# Patient Record
Sex: FEMALE | Race: WHITE | NOT HISPANIC OR LATINO
[De-identification: names, ages, dates, MRNs, and addresses within clinical notes are randomized per-mention and may not be internally consistent; named-entity substitution may affect disease eponyms.]

---

## 2017-01-18 ENCOUNTER — APPOINTMENT (OUTPATIENT)
Dept: INTERNAL MEDICINE | Facility: CLINIC | Age: 77
End: 2017-01-18

## 2017-01-18 VITALS
BODY MASS INDEX: 26.87 KG/M2 | HEIGHT: 58 IN | RESPIRATION RATE: 14 BRPM | OXYGEN SATURATION: 98 % | TEMPERATURE: 97.9 F | DIASTOLIC BLOOD PRESSURE: 80 MMHG | WEIGHT: 128 LBS | SYSTOLIC BLOOD PRESSURE: 130 MMHG | HEART RATE: 103 BPM

## 2017-01-18 DIAGNOSIS — H00.019 HORDEOLUM EXTERNUM UNSPECIFIED EYE, UNSPECIFIED EYELID: ICD-10-CM

## 2017-01-18 DIAGNOSIS — Z78.9 OTHER SPECIFIED HEALTH STATUS: ICD-10-CM

## 2017-01-19 ENCOUNTER — OTHER (OUTPATIENT)
Age: 77
End: 2017-01-19

## 2017-01-19 LAB — HEMOCCULT STL QL IA: NEGATIVE

## 2017-03-31 ENCOUNTER — APPOINTMENT (OUTPATIENT)
Dept: UROGYNECOLOGY | Facility: CLINIC | Age: 77
End: 2017-03-31

## 2017-03-31 VITALS
HEIGHT: 58 IN | WEIGHT: 129 LBS | BODY MASS INDEX: 27.08 KG/M2 | SYSTOLIC BLOOD PRESSURE: 140 MMHG | DIASTOLIC BLOOD PRESSURE: 78 MMHG

## 2017-03-31 DIAGNOSIS — Z80.3 FAMILY HISTORY OF MALIGNANT NEOPLASM OF BREAST: ICD-10-CM

## 2017-03-31 DIAGNOSIS — Z37.9 ENCOUNTER FOR FULL-TERM UNCOMPLICATED DELIVERY: ICD-10-CM

## 2017-04-03 ENCOUNTER — RESULT REVIEW (OUTPATIENT)
Age: 77
End: 2017-04-03

## 2017-04-03 LAB
APPEARANCE: CLEAR
BACTERIA UR CULT: NORMAL
BACTERIA: NEGATIVE
BILIRUB UR QL STRIP: NORMAL
BILIRUBIN URINE: NEGATIVE
BLOOD URINE: NEGATIVE
CLARITY UR: CLEAR
COLLECTION METHOD: NORMAL
COLOR: YELLOW
GLUCOSE QUALITATIVE U: NORMAL MG/DL
GLUCOSE UR-MCNC: NORMAL
HCG UR QL: 0.2 EU/DL
HGB UR QL STRIP.AUTO: NORMAL
HYALINE CASTS: 3 /LPF
KETONES UR-MCNC: NORMAL
KETONES URINE: NEGATIVE
LEUKOCYTE ESTERASE UR QL STRIP: NORMAL
LEUKOCYTE ESTERASE URINE: NEGATIVE
MICROSCOPIC-UA: NORMAL
NITRITE UR QL STRIP: NORMAL
NITRITE URINE: NEGATIVE
PH UR STRIP: 5.5
PH URINE: 6
PROT UR STRIP-MCNC: NORMAL
PROTEIN URINE: NEGATIVE MG/DL
RED BLOOD CELLS URINE: 1 /HPF
SP GR UR STRIP: 1.02
SPECIFIC GRAVITY URINE: 1.02
SQUAMOUS EPITHELIAL CELLS: 1 /HPF
UROBILINOGEN URINE: NORMAL MG/DL
WHITE BLOOD CELLS URINE: 0 /HPF

## 2017-06-05 ENCOUNTER — APPOINTMENT (OUTPATIENT)
Dept: INTERNAL MEDICINE | Facility: CLINIC | Age: 77
End: 2017-06-05

## 2017-06-05 VITALS
BODY MASS INDEX: 25.61 KG/M2 | OXYGEN SATURATION: 97 % | SYSTOLIC BLOOD PRESSURE: 120 MMHG | HEART RATE: 70 BPM | RESPIRATION RATE: 14 BRPM | DIASTOLIC BLOOD PRESSURE: 80 MMHG | WEIGHT: 122 LBS | TEMPERATURE: 98 F | HEIGHT: 58 IN

## 2017-06-05 LAB — S PYO AG SPEC QL IA: NORMAL

## 2017-06-06 LAB
T3FREE SERPL-MCNC: 2.56 PG/ML
T4 FREE SERPL-MCNC: 1.1 NG/DL
TSH SERPL-ACNC: 0.76 UIU/ML

## 2017-06-22 ENCOUNTER — APPOINTMENT (OUTPATIENT)
Dept: UROGYNECOLOGY | Facility: CLINIC | Age: 77
End: 2017-06-22

## 2017-11-02 ENCOUNTER — APPOINTMENT (OUTPATIENT)
Dept: INTERNAL MEDICINE | Facility: CLINIC | Age: 77
End: 2017-11-02
Payer: MEDICARE

## 2017-11-02 DIAGNOSIS — Z23 ENCOUNTER FOR IMMUNIZATION: ICD-10-CM

## 2017-11-02 PROCEDURE — G0008: CPT

## 2017-11-02 PROCEDURE — 90472 IMMUNIZATION ADMIN EACH ADD: CPT

## 2017-11-02 PROCEDURE — 90670 PCV13 VACCINE IM: CPT

## 2017-11-02 PROCEDURE — G0009: CPT

## 2017-11-02 PROCEDURE — 90686 IIV4 VACC NO PRSV 0.5 ML IM: CPT

## 2018-01-19 ENCOUNTER — APPOINTMENT (OUTPATIENT)
Dept: INTERNAL MEDICINE | Facility: CLINIC | Age: 78
End: 2018-01-19
Payer: MEDICARE

## 2018-01-19 VITALS
BODY MASS INDEX: 25.82 KG/M2 | OXYGEN SATURATION: 98 % | RESPIRATION RATE: 14 BRPM | HEIGHT: 58 IN | TEMPERATURE: 98.1 F | HEART RATE: 71 BPM | WEIGHT: 123 LBS | DIASTOLIC BLOOD PRESSURE: 76 MMHG | SYSTOLIC BLOOD PRESSURE: 138 MMHG

## 2018-01-19 LAB
APPEARANCE: CLEAR
BILIRUBIN URINE: NEGATIVE
BLOOD URINE: NEGATIVE
COLOR: YELLOW
GLUCOSE QUALITATIVE U: NEGATIVE MG/DL
KETONES URINE: NEGATIVE
LEUKOCYTE ESTERASE URINE: NEGATIVE
NITRITE URINE: NEGATIVE
PH URINE: 6.5
PROTEIN URINE: NEGATIVE MG/DL
SPECIFIC GRAVITY URINE: 1.01
UROBILINOGEN URINE: NEGATIVE MG/DL

## 2018-01-19 PROCEDURE — G0439: CPT

## 2018-06-01 ENCOUNTER — APPOINTMENT (OUTPATIENT)
Dept: GASTROENTEROLOGY | Facility: CLINIC | Age: 78
End: 2018-06-01
Payer: MEDICARE

## 2018-06-01 VITALS
HEIGHT: 58 IN | DIASTOLIC BLOOD PRESSURE: 88 MMHG | BODY MASS INDEX: 26.66 KG/M2 | SYSTOLIC BLOOD PRESSURE: 161 MMHG | WEIGHT: 127 LBS | HEART RATE: 64 BPM | OXYGEN SATURATION: 95 % | TEMPERATURE: 97.9 F

## 2018-06-01 DIAGNOSIS — Z80.0 FAMILY HISTORY OF MALIGNANT NEOPLASM OF DIGESTIVE ORGANS: ICD-10-CM

## 2018-06-01 DIAGNOSIS — Z12.11 ENCOUNTER FOR SCREENING FOR MALIGNANT NEOPLASM OF COLON: ICD-10-CM

## 2018-06-01 DIAGNOSIS — K63.5 POLYP OF COLON: ICD-10-CM

## 2018-06-01 DIAGNOSIS — Z80.3 FAMILY HISTORY OF MALIGNANT NEOPLASM OF BREAST: ICD-10-CM

## 2018-06-01 PROCEDURE — 99204 OFFICE O/P NEW MOD 45 MIN: CPT

## 2018-06-01 RX ORDER — ASPIRIN ENTERIC COATED TABLETS 81 MG 81 MG/1
81 TABLET, DELAYED RELEASE ORAL DAILY
Refills: 0 | Status: DISCONTINUED | COMMUNITY
Start: 2017-01-18 | End: 2018-06-01

## 2018-06-01 RX ORDER — POLYETHYLENE GLYCOL 3350, SODIUM SULFATE, SODIUM CHLORIDE, POTASSIUM CHLORIDE, ASCORBIC ACID, SODIUM ASCORBATE 7.5-2.691G
100 KIT ORAL
Qty: 1 | Refills: 0 | Status: DISCONTINUED | COMMUNITY
Start: 2017-04-12 | End: 2018-06-01

## 2018-06-01 RX ORDER — ESTRADIOL 0.1 MG/G
0.1 CREAM VAGINAL
Qty: 1 | Refills: 2 | Status: DISCONTINUED | COMMUNITY
Start: 2017-03-31 | End: 2018-06-01

## 2018-06-01 RX ORDER — VALSARTAN 40 MG/1
TABLET ORAL
Refills: 0 | Status: DISCONTINUED | COMMUNITY
End: 2018-06-01

## 2018-06-19 ENCOUNTER — TRANSCRIPTION ENCOUNTER (OUTPATIENT)
Age: 78
End: 2018-06-19

## 2018-06-20 ENCOUNTER — RESULT REVIEW (OUTPATIENT)
Age: 78
End: 2018-06-20

## 2018-06-20 ENCOUNTER — INPATIENT (INPATIENT)
Facility: HOSPITAL | Age: 78
LOS: 2 days | Discharge: HOME | DRG: 392 | End: 2018-06-23
Attending: FAMILY MEDICINE | Admitting: HOSPITALIST
Payer: COMMERCIAL

## 2018-06-20 ENCOUNTER — APPOINTMENT (OUTPATIENT)
Dept: GASTROENTEROLOGY | Facility: HOSPITAL | Age: 78
End: 2018-06-20

## 2018-06-20 ENCOUNTER — OUTPATIENT (OUTPATIENT)
Dept: OUTPATIENT SERVICES | Facility: HOSPITAL | Age: 78
LOS: 1 days | End: 2018-06-20
Payer: COMMERCIAL

## 2018-06-20 VITALS
OXYGEN SATURATION: 95 % | HEART RATE: 98 BPM | TEMPERATURE: 100 F | RESPIRATION RATE: 16 BRPM | WEIGHT: 125 LBS | SYSTOLIC BLOOD PRESSURE: 147 MMHG | DIASTOLIC BLOOD PRESSURE: 84 MMHG

## 2018-06-20 DIAGNOSIS — K63.5 POLYP OF COLON: ICD-10-CM

## 2018-06-20 DIAGNOSIS — Z80.0 FAMILY HISTORY OF MALIGNANT NEOPLASM OF DIGESTIVE ORGANS: ICD-10-CM

## 2018-06-20 DIAGNOSIS — Z12.11 ENCOUNTER FOR SCREENING FOR MALIGNANT NEOPLASM OF COLON: ICD-10-CM

## 2018-06-20 PROCEDURE — 88305 TISSUE EXAM BY PATHOLOGIST: CPT | Mod: 26

## 2018-06-20 PROCEDURE — 45380 COLONOSCOPY AND BIOPSY: CPT

## 2018-06-20 PROCEDURE — 88305 TISSUE EXAM BY PATHOLOGIST: CPT

## 2018-06-20 NOTE — ED ADULT TRIAGE NOTE - CHIEF COMPLAINT QUOTE
pt had colonoscopy this am with 5 polyups  removed- now having rectal bleeding and cramps - pt also has fever- sent in by MD Sayed,Rachell

## 2018-06-21 DIAGNOSIS — Z29.9 ENCOUNTER FOR PROPHYLACTIC MEASURES, UNSPECIFIED: ICD-10-CM

## 2018-06-21 DIAGNOSIS — Z90.49 ACQUIRED ABSENCE OF OTHER SPECIFIED PARTS OF DIGESTIVE TRACT: Chronic | ICD-10-CM

## 2018-06-21 DIAGNOSIS — Z90.89 ACQUIRED ABSENCE OF OTHER ORGANS: Chronic | ICD-10-CM

## 2018-06-21 DIAGNOSIS — K52.9 NONINFECTIVE GASTROENTERITIS AND COLITIS, UNSPECIFIED: ICD-10-CM

## 2018-06-21 DIAGNOSIS — K62.5 HEMORRHAGE OF ANUS AND RECTUM: ICD-10-CM

## 2018-06-21 DIAGNOSIS — E05.90 THYROTOXICOSIS, UNSPECIFIED WITHOUT THYROTOXIC CRISIS OR STORM: ICD-10-CM

## 2018-06-21 DIAGNOSIS — I10 ESSENTIAL (PRIMARY) HYPERTENSION: ICD-10-CM

## 2018-06-21 DIAGNOSIS — I48.91 UNSPECIFIED ATRIAL FIBRILLATION: ICD-10-CM

## 2018-06-21 DIAGNOSIS — Z98.890 OTHER SPECIFIED POSTPROCEDURAL STATES: Chronic | ICD-10-CM

## 2018-06-21 LAB
ALBUMIN SERPL ELPH-MCNC: 3.8 G/DL — SIGNIFICANT CHANGE UP (ref 3.3–5)
ALP SERPL-CCNC: 124 U/L — HIGH (ref 40–120)
ALT FLD-CCNC: 24 U/L — SIGNIFICANT CHANGE UP (ref 12–78)
ANION GAP SERPL CALC-SCNC: 11 MMOL/L — SIGNIFICANT CHANGE UP (ref 5–17)
AST SERPL-CCNC: 25 U/L — SIGNIFICANT CHANGE UP (ref 15–37)
BASOPHILS # BLD AUTO: 0.02 K/UL — SIGNIFICANT CHANGE UP (ref 0–0.2)
BASOPHILS NFR BLD AUTO: 0.1 % — SIGNIFICANT CHANGE UP (ref 0–2)
BILIRUB SERPL-MCNC: 0.8 MG/DL — SIGNIFICANT CHANGE UP (ref 0.2–1.2)
BUN SERPL-MCNC: 17 MG/DL — SIGNIFICANT CHANGE UP (ref 7–23)
CALCIUM SERPL-MCNC: 8.7 MG/DL — SIGNIFICANT CHANGE UP (ref 8.5–10.1)
CHLORIDE SERPL-SCNC: 105 MMOL/L — SIGNIFICANT CHANGE UP (ref 96–108)
CO2 SERPL-SCNC: 24 MMOL/L — SIGNIFICANT CHANGE UP (ref 22–31)
CREAT SERPL-MCNC: 0.95 MG/DL — SIGNIFICANT CHANGE UP (ref 0.5–1.3)
EOSINOPHIL # BLD AUTO: 0 K/UL — SIGNIFICANT CHANGE UP (ref 0–0.5)
EOSINOPHIL NFR BLD AUTO: 0 % — SIGNIFICANT CHANGE UP (ref 0–6)
GLUCOSE SERPL-MCNC: 153 MG/DL — HIGH (ref 70–99)
HCT VFR BLD CALC: 33.4 % — LOW (ref 34.5–45)
HCT VFR BLD CALC: 43.6 % — SIGNIFICANT CHANGE UP (ref 34.5–45)
HGB BLD-MCNC: 11.2 G/DL — LOW (ref 11.5–15.5)
HGB BLD-MCNC: 15 G/DL — SIGNIFICANT CHANGE UP (ref 11.5–15.5)
IMM GRANULOCYTES NFR BLD AUTO: 0.5 % — SIGNIFICANT CHANGE UP (ref 0–1.5)
INR BLD: 1.09 RATIO — SIGNIFICANT CHANGE UP (ref 0.88–1.16)
LACTATE SERPL-SCNC: 1.5 MMOL/L — SIGNIFICANT CHANGE UP (ref 0.7–2)
LIDOCAIN IGE QN: 89 U/L — SIGNIFICANT CHANGE UP (ref 73–393)
LYMPHOCYTES # BLD AUTO: 0.33 K/UL — LOW (ref 1–3.3)
LYMPHOCYTES # BLD AUTO: 2.2 % — LOW (ref 13–44)
MCHC RBC-ENTMCNC: 29.9 PG — SIGNIFICANT CHANGE UP (ref 27–34)
MCHC RBC-ENTMCNC: 34.4 GM/DL — SIGNIFICANT CHANGE UP (ref 32–36)
MCV RBC AUTO: 87 FL — SIGNIFICANT CHANGE UP (ref 80–100)
MONOCYTES # BLD AUTO: 0.64 K/UL — SIGNIFICANT CHANGE UP (ref 0–0.9)
MONOCYTES NFR BLD AUTO: 4.4 % — SIGNIFICANT CHANGE UP (ref 2–14)
NEUTROPHILS # BLD AUTO: 13.63 K/UL — HIGH (ref 1.8–7.4)
NEUTROPHILS NFR BLD AUTO: 92.8 % — HIGH (ref 43–77)
OB PNL STL: POSITIVE
PLATELET # BLD AUTO: 158 K/UL — SIGNIFICANT CHANGE UP (ref 150–400)
POTASSIUM SERPL-MCNC: 3.2 MMOL/L — LOW (ref 3.5–5.3)
POTASSIUM SERPL-SCNC: 3.2 MMOL/L — LOW (ref 3.5–5.3)
PROT SERPL-MCNC: 7.9 G/DL — SIGNIFICANT CHANGE UP (ref 6–8.3)
PROTHROM AB SERPL-ACNC: 11.9 SEC — SIGNIFICANT CHANGE UP (ref 9.8–12.7)
RBC # BLD: 5.01 M/UL — SIGNIFICANT CHANGE UP (ref 3.8–5.2)
RBC # FLD: 13.4 % — SIGNIFICANT CHANGE UP (ref 10.3–14.5)
SODIUM SERPL-SCNC: 140 MMOL/L — SIGNIFICANT CHANGE UP (ref 135–145)
WBC # BLD: 14.7 K/UL — HIGH (ref 3.8–10.5)
WBC # FLD AUTO: 14.7 K/UL — HIGH (ref 3.8–10.5)

## 2018-06-21 PROCEDURE — 93010 ELECTROCARDIOGRAM REPORT: CPT

## 2018-06-21 PROCEDURE — 99222 1ST HOSP IP/OBS MODERATE 55: CPT

## 2018-06-21 PROCEDURE — 12345: CPT | Mod: NC

## 2018-06-21 PROCEDURE — 74177 CT ABD & PELVIS W/CONTRAST: CPT | Mod: 26

## 2018-06-21 PROCEDURE — 99285 EMERGENCY DEPT VISIT HI MDM: CPT

## 2018-06-21 PROCEDURE — 99223 1ST HOSP IP/OBS HIGH 75: CPT | Mod: AI,GC

## 2018-06-21 RX ORDER — POTASSIUM CHLORIDE 20 MEQ
10 PACKET (EA) ORAL
Qty: 0 | Refills: 0 | Status: COMPLETED | OUTPATIENT
Start: 2018-06-21 | End: 2018-06-21

## 2018-06-21 RX ORDER — SODIUM CHLORIDE 9 MG/ML
3 INJECTION INTRAMUSCULAR; INTRAVENOUS; SUBCUTANEOUS ONCE
Qty: 0 | Refills: 0 | Status: COMPLETED | OUTPATIENT
Start: 2018-06-21 | End: 2018-06-21

## 2018-06-21 RX ORDER — METOPROLOL TARTRATE 50 MG
1 TABLET ORAL
Qty: 0 | Refills: 0 | COMMUNITY

## 2018-06-21 RX ORDER — PIPERACILLIN AND TAZOBACTAM 4; .5 G/20ML; G/20ML
3.38 INJECTION, POWDER, LYOPHILIZED, FOR SOLUTION INTRAVENOUS EVERY 8 HOURS
Qty: 0 | Refills: 0 | Status: DISCONTINUED | OUTPATIENT
Start: 2018-06-21 | End: 2018-06-23

## 2018-06-21 RX ORDER — SODIUM CHLORIDE 9 MG/ML
1000 INJECTION INTRAMUSCULAR; INTRAVENOUS; SUBCUTANEOUS ONCE
Qty: 0 | Refills: 0 | Status: COMPLETED | OUTPATIENT
Start: 2018-06-21 | End: 2018-06-21

## 2018-06-21 RX ORDER — AMLODIPINE BESYLATE 2.5 MG/1
5 TABLET ORAL DAILY
Qty: 0 | Refills: 0 | Status: DISCONTINUED | OUTPATIENT
Start: 2018-06-21 | End: 2018-06-23

## 2018-06-21 RX ORDER — PANTOPRAZOLE SODIUM 20 MG/1
40 TABLET, DELAYED RELEASE ORAL ONCE
Qty: 0 | Refills: 0 | Status: COMPLETED | OUTPATIENT
Start: 2018-06-21 | End: 2018-06-21

## 2018-06-21 RX ORDER — VALSARTAN 80 MG/1
160 TABLET ORAL DAILY
Qty: 0 | Refills: 0 | Status: DISCONTINUED | OUTPATIENT
Start: 2018-06-21 | End: 2018-06-23

## 2018-06-21 RX ORDER — METOPROLOL TARTRATE 50 MG
25 TABLET ORAL DAILY
Qty: 0 | Refills: 0 | Status: DISCONTINUED | OUTPATIENT
Start: 2018-06-21 | End: 2018-06-23

## 2018-06-21 RX ORDER — PIPERACILLIN AND TAZOBACTAM 4; .5 G/20ML; G/20ML
3.38 INJECTION, POWDER, LYOPHILIZED, FOR SOLUTION INTRAVENOUS ONCE
Qty: 0 | Refills: 0 | Status: COMPLETED | OUTPATIENT
Start: 2018-06-21 | End: 2018-06-21

## 2018-06-21 RX ADMIN — Medication 20 MILLIGRAM(S): at 00:51

## 2018-06-21 RX ADMIN — SODIUM CHLORIDE 1000 MILLILITER(S): 9 INJECTION INTRAMUSCULAR; INTRAVENOUS; SUBCUTANEOUS at 01:37

## 2018-06-21 RX ADMIN — PANTOPRAZOLE SODIUM 40 MILLIGRAM(S): 20 TABLET, DELAYED RELEASE ORAL at 02:30

## 2018-06-21 RX ADMIN — PIPERACILLIN AND TAZOBACTAM 200 GRAM(S): 4; .5 INJECTION, POWDER, LYOPHILIZED, FOR SOLUTION INTRAVENOUS at 01:37

## 2018-06-21 RX ADMIN — SODIUM CHLORIDE 1000 MILLILITER(S): 9 INJECTION INTRAMUSCULAR; INTRAVENOUS; SUBCUTANEOUS at 09:55

## 2018-06-21 RX ADMIN — SODIUM CHLORIDE 3 MILLILITER(S): 9 INJECTION INTRAMUSCULAR; INTRAVENOUS; SUBCUTANEOUS at 00:52

## 2018-06-21 RX ADMIN — PIPERACILLIN AND TAZOBACTAM 25 GRAM(S): 4; .5 INJECTION, POWDER, LYOPHILIZED, FOR SOLUTION INTRAVENOUS at 09:46

## 2018-06-21 RX ADMIN — Medication 100 MILLIEQUIVALENT(S): at 05:40

## 2018-06-21 RX ADMIN — Medication 100 MILLIEQUIVALENT(S): at 03:30

## 2018-06-21 RX ADMIN — Medication 100 MILLIEQUIVALENT(S): at 04:30

## 2018-06-21 RX ADMIN — VALSARTAN 160 MILLIGRAM(S): 80 TABLET ORAL at 06:38

## 2018-06-21 RX ADMIN — AMLODIPINE BESYLATE 5 MILLIGRAM(S): 2.5 TABLET ORAL at 06:38

## 2018-06-21 RX ADMIN — PIPERACILLIN AND TAZOBACTAM 25 GRAM(S): 4; .5 INJECTION, POWDER, LYOPHILIZED, FOR SOLUTION INTRAVENOUS at 16:45

## 2018-06-21 RX ADMIN — Medication 25 MILLIGRAM(S): at 06:38

## 2018-06-21 RX ADMIN — SODIUM CHLORIDE 1000 MILLILITER(S): 9 INJECTION INTRAMUSCULAR; INTRAVENOUS; SUBCUTANEOUS at 00:52

## 2018-06-21 NOTE — ED PROVIDER NOTE - MEDICAL DECISION MAKING DETAILS
77yo F h/o PAFIB hypothyroidism, htn s/p colonoscopy w/ 5 polyps removal this AM p/w constant nonradiating 10/10 lower abd cramping associated w/ fever tmax of 100.3, dark bloody loose bowel movements after eating tonight. denies n/v. not on AC currently.   labs, ct

## 2018-06-21 NOTE — H&P ADULT - PROBLEM SELECTOR PLAN 1
s/p Zosyn  start Flagyl and Cipro  NPO, then Clears s/p Zosyn  c/w zosyn  diet Clears  GI recommendations appreciated (Dr. Carpio)

## 2018-06-21 NOTE — ED PROVIDER NOTE - CARE PLAN
Principal Discharge DX:	Colitis  Assessment and plan of treatment:	admit  Secondary Diagnosis:	Leukocytosis  Secondary Diagnosis:	Abdominal pain

## 2018-06-21 NOTE — H&P ADULT - HISTORY OF PRESENT ILLNESS
79yo F pmhx of paroxysmal atrial fibrillation, hypothyroidism, htn s/p colonoscopy 6/20/18 with 5 polyps removed, presents with c/o constant nonradiating 10/10 lower abd cramping associated w/ fever, dark bloody loose BM. Patient returned home after colonoscopy and and advanced diet. Patient noticed intense abd cramping and dark bloody loose BMs after eating. Patient denies CP, SOB. Patient is not on AC currently for PAF. Patient sought further medical evaluation at [facility] ED.    In the ED, patient vitals were T(F): 99.6, HR: 98, BP: 147/84, RR: 16, SpO2: 95% on RA. CBC showed WBC 14.7, Hgb 15.0, Hct 43.6, Plt 158. Neutrophil % was 92.8%. CMP showed Na 140, K 3.2, Cl 105, CO2 24, BUN 17, Cr 0.95, Gluc 153. AlkPhos was 124. CT Abd/Pelvis with IV contrast showed severe ascending colitis without evidence of perforation. In the ED, patient received Zosyn, IVF NS 2L, bentyl, protonix, KCl 10meq IV x 3. 77yo F pmhx of paroxysmal atrial fibrillation, hyperthyroidism, htn s/p colonoscopy 6/20/18 with 5 polyps removed, presents with c/o constant nonradiating 10/10 lower abd cramping associated w/ fever, dark bloody loose BM. Patient returned home after colonoscopy and and advanced diet. Patient noticed intense abd cramping and dark bloody loose BMs after eating. Patient denies CP, SOB. Patient is not on AC currently for PAF. Patient sought further medical evaluation at [facility] ED.    In the ED, patient vitals were T(F): 99.6, HR: 98, BP: 147/84, RR: 16, SpO2: 95% on RA. CBC showed WBC 14.7, Hgb 15.0, Hct 43.6, Plt 158. Neutrophil % was 92.8%. CMP showed Na 140, K 3.2, Cl 105, CO2 24, BUN 17, Cr 0.95, Gluc 153. AlkPhos was 124. FOBT positive. CT Abd/Pelvis with IV contrast showed severe ascending colitis without evidence of perforation. In the ED, patient received Zosyn, IVF NS 2L, bentyl, protonix, KCl 10meq IV x 3.

## 2018-06-21 NOTE — ED PROVIDER NOTE - PHYSICAL EXAMINATION
GEN: awake, alert, well appearing, NAD   HENT: atraumatic, normocephalic, SIERRA, EOMI, no midline instability, oropharynx w/o erythema or exudates, no lymphadenopathy  CV: normal rate and rhythm, S1, S2, no MRG, equal pulses throughout, no JVD  RESP: no distress, no IWOB, no retraction, clear to auscultation bilaterally   ABD: soft, lower abd tenderness, nondistended, no rebound, no guarding, normoactive bowel sounds, no organomegally  MUSCULOSKELETAL: strenght 5/5 x 4, full range of motion, CMS intact   SKIN: normal color, no turgor, no wounds or rash   NEURO: Awake alert oriented x 3, no facial asymmetry, no slurred speech, no pronator drift, moving all extremities  PSYCH: no suicial ideation, no homicidal ideation, no depression, no anxiety, no hallucination

## 2018-06-21 NOTE — H&P ADULT - ASSESSMENT
79yo F pmhx of paroxysmal atrial fibrillation, hyperthyroidism, htn s/p colonoscopy 6/20/18 with 5 polyps removed, presents with c/o constant nonradiating 10/10 lower abd cramping associated w/ fever, dark bloody loose BM, admitted for ascending colitis.

## 2018-06-21 NOTE — PROGRESS NOTE ADULT - PROBLEM SELECTOR PLAN 3
c/w home metoprolol  not on AC at home per her own preference--has been advised she is at elevated risk for stroke by having afib and not being on anticoagulation.  follow up with Cardiology outpt

## 2018-06-21 NOTE — H&P ADULT - NSHPREVIEWOFSYSTEMS_GEN_ALL_CORE
Constitutional: denies fever, chills, diaphoresis   HEENT: denies blurry vision, difficulty hearing  Respiratory: denies SOB, TOUSSAINT, cough, sputum production, wheezing, hemoptysis  Cardiovascular: denies CP, palpitations, edema  Gastrointestinal: + Abd pain, denies nausea, vomiting, diarrhea, constipation, melena, hematochezia   Genitourinary: denies dysuria, frequency, urgency, hematuria   Skin/Breast: denies rash, itching  Musculoskeletal: denies myalgias, joint swelling, muscle weakness  Neurologic: denies headache, weakness, dizziness, paresthesias, numbness/tingling  Psychiatric: denies feeling anxious, depressed, suicidal, homicidal thoughts  Hematology/Oncology: denies bruising, tender or enlarged lymph nodes   ROS negative except as noted above

## 2018-06-21 NOTE — ED PROVIDER NOTE - NS ED ROS FT
GEN: +fever, no chills, no weakness  HENT: no eye pain, no visual changes, no ear pain, no visual or hearing changes, no sore throat, no swelling or neck pain  CV: no chest pain, no palpitations, no dizziness, no swelling  RESP: no coughing, no sob, no IWOB, no TOUSSAINT  GI: +abd pain, no distension, no nausea, no vomiting, + diarrhea, no constipation  : no dysuria,  no frequency, no hematuria, no discharge, no flank pain  MUSCULOSKELETAL: no myalgia, no arthralgia, no joint swelling, no bruising   SKIN: no rash, no wounds, no itching  NEURO: no change in mentation, no visual changes, no HA, no focal weakness, no trouble speaking, no gait abnormalities, no dizziness  PSYCH: no suidical ideation, no homicidal ideation, no depression, no anxiety, no hallucinations

## 2018-06-21 NOTE — CONSULT NOTE ADULT - PROBLEM SELECTOR RECOMMENDATION 9
1.  Continue IV abx  2.  Continue fluids  3.  May start liquid diet today  4.  Will follow with you.

## 2018-06-21 NOTE — PROGRESS NOTE ADULT - SUBJECTIVE AND OBJECTIVE BOX
HPI:  77yo F pmhx of paroxysmal atrial fibrillation, hyperthyroidism, htn s/p colonoscopy 6/20/18 with 5 polyps removed, presents with c/o constant nonradiating 10/10 lower abd cramping associated w/ fever, dark bloody loose BM. Patient returned home after colonoscopy and and advanced diet. Patient noticed intense abd cramping and dark bloody loose BMs after eating. Patient denies CP, SOB. Patient is not on AC currently for PAF. Patient sought further medical evaluation at [facility] ED.    In the ED, patient vitals were T(F): 99.6, HR: 98, BP: 147/84, RR: 16, SpO2: 95% on RA. CBC showed WBC 14.7, Hgb 15.0, Hct 43.6, Plt 158. Neutrophil % was 92.8%. CMP showed Na 140, K 3.2, Cl 105, CO2 24, BUN 17, Cr 0.95, Gluc 153. AlkPhos was 124. FOBT positive. CT Abd/Pelvis with IV contrast showed severe ascending colitis without evidence of perforation. In the ED, patient received Zosyn, IVF NS 2L, bentyl, protonix, KCl 10meq IV x 3. (21 Jun 2018 04:53)    INTERVAL EVENTS:   Patient seen and examined. Doing ok today. Still with some blood per rectum. Pain has improved. No N/V/D.     REVIEW OF SYSTEMS:    CONSTITUTIONAL: No weakness, fevers or chills  EYES/ENT: No visual changes, no throat pain   RESPIRATORY: No cough, wheezing, hemoptysis; No shortness of breath  CARDIOVASCULAR: No chest pain or palpitations  GASTROINTESTINAL: No abdominal pain, nausea, vomiting, or hematemesis; No diarrhea or constipation. +BRBPR, some small clots.  GENITOURINARY: No dysuria, frequency or hematuria  NEUROLOGICAL: No dizziness, numbness, or weakness  SKIN: No itching, burning, rashes, or lesions   All other review of systems is negative unless indicated above.    VITAL SIGNS:  Vital Signs Last 24 Hrs  T(C): 36.9 (06-21-18 @ 10:27), Max: 37.6 (06-20-18 @ 23:55)  T(F): 98.4 (06-21-18 @ 10:27), Max: 99.6 (06-20-18 @ 23:55)  HR: 82 (06-21-18 @ 10:27) (82 - 98)  BP: 100/63 (06-21-18 @ 10:27) (96/61 - 147/84)  BP(mean): --  RR: 16 (06-21-18 @ 10:27) (14 - 16)  SpO2: 92% (06-21-18 @ 10:27) (92% - 100%)      PHYSICAL EXAM:     GENERAL: no acute distress  HEENT: NC/AT, EOMI, neck supple, MMM  RESPIRATORY: LCTAB/L, no rhonchi, rales, or wheezing  CARDIOVASCULAR: RRR, no murmurs, gallops, rubs  ABDOMINAL: soft, non-tender, non-distended, positive bowel sounds   EXTREMITIES: no clubbing, cyanosis, or edema  NEUROLOGICAL: alert and oriented x 3, non-focal  SKIN: no rashes or lesions   MUSCULOSKELETAL: no gross joint deformity                          15.0   14.70 )-----------( 158      ( 21 Jun 2018 00:52 )             43.6     06-21    140  |  105  |  17  ----------------------------<  153<H>  3.2<L>   |  24  |  0.95    Ca    8.7      21 Jun 2018 01:03    TPro  7.9  /  Alb  3.8  /  TBili  0.8  /  DBili  x   /  AST  25  /  ALT  24  /  AlkPhos  124<H>  06-21    PT/INR - ( 21 Jun 2018 01:03 )   PT: 11.9 sec;   INR: 1.09 ratio        MEDICATIONS  (STANDING):  amLODIPine   Tablet 5 milliGRAM(s) Oral daily  methimazole 5 milliGRAM(s) Oral daily  metoprolol succinate ER 25 milliGRAM(s) Oral daily  piperacillin/tazobactam IVPB. 3.375 Gram(s) IV Intermittent every 8 hours  valsartan 160 milliGRAM(s) Oral daily    MEDICATIONS  (PRN):

## 2018-06-21 NOTE — ED PROVIDER NOTE - OBJECTIVE STATEMENT
79yo F h/o PAFIB hypothyroidism, htn s/p colonoscopy w/ 5 polyps removal this AM p/w constant nonradiating 10/10 lower abd cramping associated w/ fever tmax of 100.3, dark bloody loose bowel movements after eating tonight. denies n/v. not on AC currently.

## 2018-06-21 NOTE — CONSULT NOTE ADULT - SUBJECTIVE AND OBJECTIVE BOX
79 y/o woman with hx of HTN, Afib not on anticoagulation, family hx of colon cancer (sister), personal hx of colon polyps who underwent colonoscopy yesterday and admitted for abdominal pain, vomiting, and found to have ascending colitis on CT scan.      During colonoscopy she was found to have diverticulosis, along with few small sessile polyps are removed by cold snare.  There was a ? polyp vs inverted diverticulum s/p bx - pathology negative.  Pathology report for all other polyps were tubular adenoma.      Pt reports after procedure feeling well.  She went home and slept.  After waking up she had a heavy meal and started having worsening abdominal pain associated with rectal bleeding, nausea and vomiting.  She felt feverish/chills.      On arrival to ER she had CT scan and found to have ascending colitis without perforation.  Labs showed elevated WBC and normal H/H.   She was started on abx, and IV fluids in ER.     Pt reports feeling much better.  no longer with nausea and vomiting.  Sever abdominal pain has resolved - mild abdominal soreness.  Denies fevers and chills.         Review of systems: all other review of systems are negative.            Allergies:  	No Known Allergies:       Patient History:   Past Medical History:  Afib    HTN (hypertension)    Overactive thyroid gland.    Past Surgical History:  H/O colonoscopy  History of tonsillectomy    S/P appendectomy        Family History:  Sister had rectal cancer        Social History:  Social History (marital status, living situation, occupation, tobacco use, alcohol and drug use, and sexual history): Lives with   Former social smoker, several cigarettes per week 40 years ago  Infrequent EtOH  No Drugs Ambulates independently	        MEDICATIONS  (STANDING):  amLODIPine   Tablet 5 milliGRAM(s) Oral daily  methimazole 5 milliGRAM(s) Oral daily  metoprolol succinate ER 25 milliGRAM(s) Oral daily  piperacillin/tazobactam IVPB. 3.375 Gram(s) IV Intermittent every 8 hours  valsartan 160 milliGRAM(s) Oral daily      ICU Vital Signs Last 24 Hrs  T(C): 37.2 (21 Jun 2018 16:16), Max: 37.6 (20 Jun 2018 23:55)  T(F): 99 (21 Jun 2018 16:16), Max: 99.6 (20 Jun 2018 23:55)  HR: 75 (21 Jun 2018 16:16) (75 - 98)  BP: 93/54 (21 Jun 2018 16:16) (93/54 - 147/84)  BP(mean): --  ABP: --  ABP(mean): --  RR: 18 (21 Jun 2018 16:16) (14 - 18)  SpO2: 94% (21 Jun 2018 16:16) (92% - 100%)      PHYSICAL EXAM:  Constitutional:  Comfortable appearing  HEENT: NCAT, anicteric sclera, moist mucous membranes  Respiratory:  CTA b/l, no w/r/r  Cardiovascular:  nl S1, S2, no m/r/g  Gastrointestinal:  Soft, +BS, NT, ND, no hepatosplenomegally  Extremities:  no E/C/C  Neurological:  Alert and orriented x 3.  Skin:  no Jaundice  Lymph Nodes:  no lymphadenopathy in neck, no supraclavicular adenopathy  Musculoskeletal:  normal gait  Psychiatric:  Normal Mood and affect                            11.2   x     )-----------( x        ( 21 Jun 2018 16:59 )             33.4         EXAM:  CT ABDOMEN AND PELVIS IC                            PROCEDURE DATE:  06/21/2018          INTERPRETATION:  CT ABDOMEN AND PELVIS WITH CONTRAST    INDICATION: Lower abdominal pain, GI bleed after colonoscopy.    TECHNIQUE: Contrast enhanced CTof the abdomen and pelvis.     90 mL of Omnipaque 350 contrast material was injected IV.    COMPARISON: None.    FINDINGS:    Lower Thorax: No consolidation or effusion.        Liver: 1.0 cm indeterminate lesion in the right hepatic lobe may be a   hemangioma.  Biliary: No dilatation.      Spleen: No suspicious lesions.      Pancreas: No inflammatory changes or ductal dilatation.      Adrenals: Normal.      Kidneys: No hydronephrosis or solid mass.      Vessels: Normal caliber. Mild atherosclerotic disease of the aorta and   its branches.    GI tract: No evidence of small bowel obstruction. There is marked   inflammatory change with wall thickening of the cecum. Diverticulosis.    Peritoneum/retroperitoneum and mesentery: No free air. No organized fluid   collection. No adenopathy.        Pelvic organs/Bladder: No pelvic masses. Bladder is normal.        Abdominal wall: Unremarkable.  Bones and soft tissues: No destructive lesion.        IMPRESSION:    Severe ascending colitis. No evidence of perforation.                      EDDIE MONTILLA M.D., ATTENDING RADIOLOGIST  This document has been electronically signed. Jun 21 2018  2:40AM

## 2018-06-21 NOTE — H&P ADULT - NSHPPHYSICALEXAM_GEN_ALL_CORE
T(F): 99.6 (06-20-18 @ 23:55)  HR: 98 (06-20-18 @ 23:55)  BP: 147/84 (06-20-18 @ 23:55)  RR: 16 (06-20-18 @ 23:55)  SpO2: 95% (06-20-18 @ 23:55)    Physical Exam:  General: Well developed, well nourished, NAD  HEENT: NCAT, PERRLA, EOMI bl, moist mucous membranes   Neck: Supple, nontender, no mass  Neurology: A&Ox3, nonfocal, CN II-XII grossly intact, sensation intact, no gait abnormalities   Respiratory: CTA B/L, No W/R/R  CV: RRR, +S1/S2, no murmurs, rubs or gallops  Abdominal: Soft, Mildly TTP LLQ, ND, +BSx4  Extremities: No C/C/E, + peripheral pulses  MSK: Normal ROM, no joint erythema or warmth, no joint swelling   Skin: warm, dry, normal color, no rash or abnormal lesions

## 2018-06-21 NOTE — PROGRESS NOTE ADULT - PROBLEM SELECTOR PLAN 2
Likely due to recent colonoscopy and polyp removal.   No evidence of large volume blood loss.  Continue to monitor H/H.

## 2018-06-21 NOTE — PROGRESS NOTE ADULT - ASSESSMENT
79 yo F pmhx of paroxysmal atrial fibrillation, hyperthyroidism, htn s/p colonoscopy 6/20/18 with 5 polyps removed, presents with c/o constant nonradiating 10/10 lower abd cramping associated w/ fever, dark bloody loose BM, admitted for ascending colitis.

## 2018-06-21 NOTE — CONSULT NOTE ADULT - ASSESSMENT
79 y/o woman with hx of HTN, Afib not on anticoagulation, family hx of colon cancer (sister), personal hx of colon polyps who underwent colonoscopy yesterday and admitted for abdominal pain, vomiting, and found to have ascending colitis on CT scan.  Unclear cause of ascending colitis on recent CT scan ? air insuflation, ? from diverticular disease.  Persistent rectal bleeding - minor from clinical description - no acute drop in H/H on admission, if occurs likely dilutional effect.  Would hold off on frequent phlebotomy unless pt reports heavy bleeding. 79 y/o woman with hx of HTN, Afib not on anticoagulation, family hx of colon cancer (sister), personal hx of colon polyps who underwent colonoscopy yesterday and admitted for abdominal pain, vomiting, and found to have ascending colitis on CT scan.  Unclear cause of ascending colitis on recent CT scan ? air insuflation, ? from diverticular disease.  Persistent rectal bleeding - minor from clinical description, no tachycardia, no hypotension - no acute drop in H/H on admission, if occurs likely dilutional effect.  Would hold off on frequent phlebotomy unless pt reports heavy bleeding.

## 2018-06-21 NOTE — ED ADULT NURSE NOTE - OBJECTIVE STATEMENT
Called Phu to find out about Alexia   She is now in the [facility]   She is taking some food by mouth but still has her gtube   Her trach is out  She is hoping to get home soon I will reach out again in a few weeks   patient a/o x 4 with a calm affect c/o small rectal bleed and fever after having colonoscopy a removal of 5 polyps today.  patien denies abdominal pains at this time, but was suggested by PMD to come to ED to r/u perforation from procedure.

## 2018-06-21 NOTE — H&P ADULT - PROBLEM SELECTOR PLAN 2
c/w home metoprolol  not on AC at home for financial reasons  Cardiology recommendations appreciated c/w home metoprolol  not on AC at home for financial reasons  follow up with Cardiology outpt

## 2018-06-22 DIAGNOSIS — K52.9 NONINFECTIVE GASTROENTERITIS AND COLITIS, UNSPECIFIED: ICD-10-CM

## 2018-06-22 LAB
ANION GAP SERPL CALC-SCNC: 8 MMOL/L — SIGNIFICANT CHANGE UP (ref 5–17)
BUN SERPL-MCNC: 5 MG/DL — LOW (ref 7–23)
CALCIUM SERPL-MCNC: 8.1 MG/DL — LOW (ref 8.5–10.1)
CHLORIDE SERPL-SCNC: 113 MMOL/L — HIGH (ref 96–108)
CO2 SERPL-SCNC: 26 MMOL/L — SIGNIFICANT CHANGE UP (ref 22–31)
CREAT SERPL-MCNC: 0.75 MG/DL — SIGNIFICANT CHANGE UP (ref 0.5–1.3)
GLUCOSE SERPL-MCNC: 93 MG/DL — SIGNIFICANT CHANGE UP (ref 70–99)
HCT VFR BLD CALC: 33.7 % — LOW (ref 34.5–45)
HGB BLD-MCNC: 11.5 G/DL — SIGNIFICANT CHANGE UP (ref 11.5–15.5)
MCHC RBC-ENTMCNC: 30.4 PG — SIGNIFICANT CHANGE UP (ref 27–34)
MCHC RBC-ENTMCNC: 34.1 GM/DL — SIGNIFICANT CHANGE UP (ref 32–36)
MCV RBC AUTO: 89.2 FL — SIGNIFICANT CHANGE UP (ref 80–100)
NRBC # BLD: 0 /100 WBCS — SIGNIFICANT CHANGE UP (ref 0–0)
PLATELET # BLD AUTO: 124 K/UL — LOW (ref 150–400)
POTASSIUM SERPL-MCNC: 3.3 MMOL/L — LOW (ref 3.5–5.3)
POTASSIUM SERPL-SCNC: 3.3 MMOL/L — LOW (ref 3.5–5.3)
RBC # BLD: 3.78 M/UL — LOW (ref 3.8–5.2)
RBC # FLD: 14.1 % — SIGNIFICANT CHANGE UP (ref 10.3–14.5)
SODIUM SERPL-SCNC: 147 MMOL/L — HIGH (ref 135–145)
WBC # BLD: 14.01 K/UL — HIGH (ref 3.8–10.5)
WBC # FLD AUTO: 14.01 K/UL — HIGH (ref 3.8–10.5)

## 2018-06-22 PROCEDURE — 99233 SBSQ HOSP IP/OBS HIGH 50: CPT

## 2018-06-22 PROCEDURE — 99232 SBSQ HOSP IP/OBS MODERATE 35: CPT

## 2018-06-22 RX ORDER — POTASSIUM CHLORIDE 20 MEQ
40 PACKET (EA) ORAL ONCE
Qty: 0 | Refills: 0 | Status: COMPLETED | OUTPATIENT
Start: 2018-06-22 | End: 2018-06-22

## 2018-06-22 RX ADMIN — VALSARTAN 160 MILLIGRAM(S): 80 TABLET ORAL at 12:54

## 2018-06-22 RX ADMIN — PIPERACILLIN AND TAZOBACTAM 25 GRAM(S): 4; .5 INJECTION, POWDER, LYOPHILIZED, FOR SOLUTION INTRAVENOUS at 09:34

## 2018-06-22 RX ADMIN — PIPERACILLIN AND TAZOBACTAM 25 GRAM(S): 4; .5 INJECTION, POWDER, LYOPHILIZED, FOR SOLUTION INTRAVENOUS at 17:46

## 2018-06-22 RX ADMIN — PIPERACILLIN AND TAZOBACTAM 25 GRAM(S): 4; .5 INJECTION, POWDER, LYOPHILIZED, FOR SOLUTION INTRAVENOUS at 00:16

## 2018-06-22 RX ADMIN — AMLODIPINE BESYLATE 5 MILLIGRAM(S): 2.5 TABLET ORAL at 06:55

## 2018-06-22 RX ADMIN — Medication 40 MILLIEQUIVALENT(S): at 09:48

## 2018-06-22 RX ADMIN — Medication 25 MILLIGRAM(S): at 06:55

## 2018-06-22 NOTE — PROGRESS NOTE ADULT - ASSESSMENT
77 yo F pmhx of paroxysmal atrial fibrillation, hyperthyroidism, htn s/p colonoscopy 6/20/18 with 5 polyps removed, presents with c/o constant nonradiating 10/10 lower abd cramping associated w/ fever, dark bloody loose BM, admitted for ascending colitis.

## 2018-06-22 NOTE — PROGRESS NOTE ADULT - SUBJECTIVE AND OBJECTIVE BOX
77 y/o woman with hx of HTN, Afib not on anticoagulation, family hx of colon cancer (sister), personal hx of colon polyps who underwent colonoscopy yesterday and admitted for abdominal pain, vomiting, and found to have ascending colitis on CT scan.      During colonoscopy she was found to have diverticulosis, along with few small sessile polyps are removed by cold snare.  There was a ? polyp vs inverted diverticulum in sigmoid colon s/p bx - pathology negative.  Pathology report for all other polyps were tubular adenoma.      Pt reports after procedure feeling well.  She went home and slept.  After waking up she had a heavy meal and started having worsening abdominal pain associated with rectal bleeding, nausea and vomiting.  She felt feverish/chills.      On arrival to ER she had CT scan and found to have ascending colitis without perforation.  Labs showed elevated WBC and normal H/H.          Today's note:     Patient reports all day yesterday rectal bleeding "slight".  This morning no bleeding.  She continues to report feeling better but says a bit "sore".  She was able to tolerate liquid diet yesterday and feels hungry.  She was ambulating yesterday with her Daughter.  Denies fevers, chills, LH, nausea and vomiting.          ICU Vital Signs Last 24 Hrs  T(C): 37.1 (22 Jun 2018 04:32), Max: 37.3 (21 Jun 2018 09:55)  T(F): 98.8 (22 Jun 2018 04:32), Max: 99.2 (21 Jun 2018 09:55)  HR: 70 (22 Jun 2018 06:49) (70 - 88)  BP: 123/71 (22 Jun 2018 06:49) (93/54 - 123/71)  BP(mean): --  ABP: --  ABP(mean): --  RR: 16 (22 Jun 2018 06:49) (16 - 18)  SpO2: 90% (22 Jun 2018 04:32) (90% - 100%)      PHYSICAL EXAM:    Constitutional:  Comfortable appearing  Gastrointestinal:  Soft, +BS, slight tenderness in right sided abdomen without rebound, ND, no hepatosplenomegally  Neurological:  Alert and orriented x 3.  Psychiatric:  Normal Mood and affect                              11.5   14.01 )-----------( 124      ( 22 Jun 2018 06:44 )             33.7       06-22    147<H>  |  113<H>  |  5<L>  ----------------------------<  93  3.3<L>   |  26  |  0.75    Ca    8.1<L>      22 Jun 2018 06:44    TPro  7.9  /  Alb  3.8  /  TBili  0.8  /  DBili  x   /  AST  25  /  ALT  24  /  AlkPhos  124<H>  06-21

## 2018-06-22 NOTE — PROGRESS NOTE ADULT - ASSESSMENT
77 y/o woman with hx of HTN, Afib not on anticoagulation, family hx of colon cancer (sister), personal hx of colon polyps who was admitted postcolonoscopy for worsening abdominal pain, vomiting, and found to have ascending colitis on CT scan.  Unclear cause of ascending colitis on recent CT scan ? air insuflation, ? from diverticular disease.  Persistent rectal bleeding - minor from clinical description, no tachycardia, no hypotension - no acute drop in H/H on admission, if occurs likely dilutional effect.  Would hold off on frequent phlebotomy unless pt reports heavy bleeding.       Clinically improving, however persistent leukocytosis.  Would give a trial of advancing diet to full liquids.  Continue abx, and IV fluids.

## 2018-06-22 NOTE — PROGRESS NOTE ADULT - SUBJECTIVE AND OBJECTIVE BOX
HPI:  77yo F pmhx of paroxysmal atrial fibrillation, hyperthyroidism, htn s/p colonoscopy 6/20/18 with 5 polyps removed, presents with c/o constant nonradiating 10/10 lower abd cramping associated w/ fever, dark bloody loose BM. Patient returned home after colonoscopy and and advanced diet. Patient noticed intense abd cramping and dark bloody loose BMs after eating. Patient denies CP, SOB. Patient is not on AC currently for PAF. Patient sought further medical evaluation at [facility] ED.    In the ED, patient vitals were T(F): 99.6, HR: 98, BP: 147/84, RR: 16, SpO2: 95% on RA. CBC showed WBC 14.7, Hgb 15.0, Hct 43.6, Plt 158. Neutrophil % was 92.8%. CMP showed Na 140, K 3.2, Cl 105, CO2 24, BUN 17, Cr 0.95, Gluc 153. AlkPhos was 124. FOBT positive. CT Abd/Pelvis with IV contrast showed severe ascending colitis without evidence of perforation. In the ED, patient received Zosyn, IVF NS 2L, bentyl, protonix, KCl 10meq IV x 3. (21 Jun 2018 04:53)    INTERVAL EVENTS:   Patient seen and examined. Doing ok today. No bleeding overnight, but had one episode of dark blood per rectum earlier today. Pain has improved and is more of a "soreness" at this point. No N/V/D.     REVIEW OF SYSTEMS:    CONSTITUTIONAL: No weakness, fevers or chills  EYES/ENT: No visual changes, no throat pain   RESPIRATORY: No cough, wheezing, hemoptysis; No shortness of breath  CARDIOVASCULAR: No chest pain or palpitations  GASTROINTESTINAL: +abdominal discomfort, nausea, vomiting, or hematemesis; No diarrhea or constipation. +dark blood per rectum.  GENITOURINARY: No dysuria, frequency or hematuria  NEUROLOGICAL: No dizziness, numbness, or weakness  SKIN: No itching, burning, rashes, or lesions   All other review of systems is negative unless indicated above.    Vital Signs Last 24 Hrs  T(C): 36.4 (22 Jun 2018 11:17), Max: 37.2 (21 Jun 2018 16:16)  T(F): 97.5 (22 Jun 2018 11:17), Max: 99 (21 Jun 2018 16:16)  HR: 75 (22 Jun 2018 11:17) (62 - 75)  BP: 142/74 (22 Jun 2018 11:17) (93/54 - 142/74)  BP(mean): --  RR: 18 (22 Jun 2018 11:17) (16 - 18)  SpO2: 93% (22 Jun 2018 11:17) (90% - 98%)    PHYSICAL EXAM:     GENERAL: no acute distress  HEENT: NC/AT, EOMI, neck supple, MMM  RESPIRATORY: LCTAB/L, no rhonchi, rales, or wheezing  CARDIOVASCULAR: RRR, no murmurs, gallops, rubs  ABDOMINAL: soft, mild tenderness to palpation, non-distended, positive bowel sounds   EXTREMITIES: no clubbing, cyanosis, or edema  NEUROLOGICAL: alert and oriented x 3, non-focal  SKIN: no rashes or lesions   MUSCULOSKELETAL: no gross joint deformity                        11.5   14.01 )-----------( 124      ( 22 Jun 2018 06:44 )             33.7   06-22    147<H>  |  113<H>  |  5<L>  ----------------------------<  93  3.3<L>   |  26  |  0.75    Ca    8.1<L>      22 Jun 2018 06:44    TPro  7.9  /  Alb  3.8  /  TBili  0.8  /  DBili  x   /  AST  25  /  ALT  24  /  AlkPhos  124<H>  06-21    MEDICATIONS  (STANDING):  amLODIPine   Tablet 5 milliGRAM(s) Oral daily  methimazole 5 milliGRAM(s) Oral daily  metoprolol succinate ER 25 milliGRAM(s) Oral daily  piperacillin/tazobactam IVPB. 3.375 Gram(s) IV Intermittent every 8 hours  valsartan 160 milliGRAM(s) Oral daily    MEDICATIONS  (PRN):

## 2018-06-23 ENCOUNTER — TRANSCRIPTION ENCOUNTER (OUTPATIENT)
Age: 78
End: 2018-06-23

## 2018-06-23 VITALS
OXYGEN SATURATION: 96 % | HEART RATE: 69 BPM | RESPIRATION RATE: 17 BRPM | DIASTOLIC BLOOD PRESSURE: 84 MMHG | SYSTOLIC BLOOD PRESSURE: 132 MMHG | TEMPERATURE: 99 F

## 2018-06-23 LAB
ANION GAP SERPL CALC-SCNC: 9 MMOL/L — SIGNIFICANT CHANGE UP (ref 5–17)
BUN SERPL-MCNC: 3 MG/DL — LOW (ref 7–23)
CALCIUM SERPL-MCNC: 8.5 MG/DL — SIGNIFICANT CHANGE UP (ref 8.5–10.1)
CHLORIDE SERPL-SCNC: 107 MMOL/L — SIGNIFICANT CHANGE UP (ref 96–108)
CO2 SERPL-SCNC: 29 MMOL/L — SIGNIFICANT CHANGE UP (ref 22–31)
CREAT SERPL-MCNC: 0.74 MG/DL — SIGNIFICANT CHANGE UP (ref 0.5–1.3)
GLUCOSE SERPL-MCNC: 91 MG/DL — SIGNIFICANT CHANGE UP (ref 70–99)
HCT VFR BLD CALC: 40.6 % — SIGNIFICANT CHANGE UP (ref 34.5–45)
HGB BLD-MCNC: 13.6 G/DL — SIGNIFICANT CHANGE UP (ref 11.5–15.5)
MCHC RBC-ENTMCNC: 29.7 PG — SIGNIFICANT CHANGE UP (ref 27–34)
MCHC RBC-ENTMCNC: 33.5 GM/DL — SIGNIFICANT CHANGE UP (ref 32–36)
MCV RBC AUTO: 88.6 FL — SIGNIFICANT CHANGE UP (ref 80–100)
NRBC # BLD: 0 /100 WBCS — SIGNIFICANT CHANGE UP (ref 0–0)
PLATELET # BLD AUTO: 159 K/UL — SIGNIFICANT CHANGE UP (ref 150–400)
POTASSIUM SERPL-MCNC: 3 MMOL/L — LOW (ref 3.5–5.3)
POTASSIUM SERPL-SCNC: 3 MMOL/L — LOW (ref 3.5–5.3)
RBC # BLD: 4.58 M/UL — SIGNIFICANT CHANGE UP (ref 3.8–5.2)
RBC # FLD: 13.9 % — SIGNIFICANT CHANGE UP (ref 10.3–14.5)
SODIUM SERPL-SCNC: 145 MMOL/L — SIGNIFICANT CHANGE UP (ref 135–145)
WBC # BLD: 9.4 K/UL — SIGNIFICANT CHANGE UP (ref 3.8–10.5)
WBC # FLD AUTO: 9.4 K/UL — SIGNIFICANT CHANGE UP (ref 3.8–10.5)

## 2018-06-23 PROCEDURE — 99285 EMERGENCY DEPT VISIT HI MDM: CPT | Mod: 25

## 2018-06-23 PROCEDURE — 87040 BLOOD CULTURE FOR BACTERIA: CPT

## 2018-06-23 PROCEDURE — 74177 CT ABD & PELVIS W/CONTRAST: CPT

## 2018-06-23 PROCEDURE — 93005 ELECTROCARDIOGRAM TRACING: CPT

## 2018-06-23 PROCEDURE — 85018 HEMOGLOBIN: CPT

## 2018-06-23 PROCEDURE — 85014 HEMATOCRIT: CPT

## 2018-06-23 PROCEDURE — 86900 BLOOD TYPING SEROLOGIC ABO: CPT

## 2018-06-23 PROCEDURE — 85027 COMPLETE CBC AUTOMATED: CPT

## 2018-06-23 PROCEDURE — 83690 ASSAY OF LIPASE: CPT

## 2018-06-23 PROCEDURE — 86901 BLOOD TYPING SEROLOGIC RH(D): CPT

## 2018-06-23 PROCEDURE — 99239 HOSP IP/OBS DSCHRG MGMT >30: CPT

## 2018-06-23 PROCEDURE — 83605 ASSAY OF LACTIC ACID: CPT

## 2018-06-23 PROCEDURE — 80053 COMPREHEN METABOLIC PANEL: CPT

## 2018-06-23 PROCEDURE — 80048 BASIC METABOLIC PNL TOTAL CA: CPT

## 2018-06-23 PROCEDURE — 82272 OCCULT BLD FECES 1-3 TESTS: CPT

## 2018-06-23 PROCEDURE — 36415 COLL VENOUS BLD VENIPUNCTURE: CPT

## 2018-06-23 PROCEDURE — 96375 TX/PRO/DX INJ NEW DRUG ADDON: CPT

## 2018-06-23 PROCEDURE — 85610 PROTHROMBIN TIME: CPT

## 2018-06-23 PROCEDURE — 96365 THER/PROPH/DIAG IV INF INIT: CPT

## 2018-06-23 PROCEDURE — 86850 RBC ANTIBODY SCREEN: CPT

## 2018-06-23 RX ORDER — AMLODIPINE BESYLATE 2.5 MG/1
1 TABLET ORAL
Qty: 0 | Refills: 0 | COMMUNITY
Start: 2018-06-23

## 2018-06-23 RX ORDER — METHIMAZOLE 10 MG/1
1 TABLET ORAL
Qty: 0 | Refills: 0 | COMMUNITY
Start: 2018-06-23

## 2018-06-23 RX ORDER — VALSARTAN 80 MG/1
1 TABLET ORAL
Qty: 0 | Refills: 0 | COMMUNITY
Start: 2018-06-23

## 2018-06-23 RX ORDER — VALSARTAN 80 MG/1
1 TABLET ORAL
Qty: 0 | Refills: 0 | COMMUNITY

## 2018-06-23 RX ORDER — METOPROLOL TARTRATE 50 MG
1 TABLET ORAL
Qty: 0 | Refills: 0 | COMMUNITY

## 2018-06-23 RX ORDER — POTASSIUM CHLORIDE 20 MEQ
40 PACKET (EA) ORAL ONCE
Qty: 0 | Refills: 0 | Status: COMPLETED | OUTPATIENT
Start: 2018-06-23 | End: 2018-06-23

## 2018-06-23 RX ORDER — METOPROLOL TARTRATE 50 MG
1 TABLET ORAL
Qty: 0 | Refills: 0 | COMMUNITY
Start: 2018-06-23

## 2018-06-23 RX ORDER — LACTOBACILLUS ACIDOPHILUS 100MM CELL
1 CAPSULE ORAL
Qty: 0 | Refills: 0 | COMMUNITY
Start: 2018-06-23

## 2018-06-23 RX ORDER — LACTOBACILLUS ACIDOPHILUS 100MM CELL
1 CAPSULE ORAL
Qty: 0 | Refills: 0 | Status: DISCONTINUED | OUTPATIENT
Start: 2018-06-23 | End: 2018-06-23

## 2018-06-23 RX ADMIN — Medication 1 TABLET(S): at 17:47

## 2018-06-23 RX ADMIN — VALSARTAN 160 MILLIGRAM(S): 80 TABLET ORAL at 05:38

## 2018-06-23 RX ADMIN — Medication 40 MILLIEQUIVALENT(S): at 08:57

## 2018-06-23 RX ADMIN — PIPERACILLIN AND TAZOBACTAM 25 GRAM(S): 4; .5 INJECTION, POWDER, LYOPHILIZED, FOR SOLUTION INTRAVENOUS at 08:57

## 2018-06-23 RX ADMIN — AMLODIPINE BESYLATE 5 MILLIGRAM(S): 2.5 TABLET ORAL at 05:38

## 2018-06-23 RX ADMIN — PIPERACILLIN AND TAZOBACTAM 25 GRAM(S): 4; .5 INJECTION, POWDER, LYOPHILIZED, FOR SOLUTION INTRAVENOUS at 01:48

## 2018-06-23 RX ADMIN — Medication 25 MILLIGRAM(S): at 05:38

## 2018-06-23 NOTE — PROGRESS NOTE ADULT - ASSESSMENT
77 y/o woman with hx of HTN, Afib not on anticoagulation, family hx of colon cancer (sister), personal hx of colon polyps who was admitted postcolonoscopy for worsening abdominal pain, vomiting, and found to have ascending colitis on CT scan.  Unclear cause of ascending colitis on recent CT scan ? air insuflation, ? from diverticular disease.  Persistent rectal bleeding - minor from clinical description, no tachycardia, no hypotension - no acute drop in H/H on admission, if occurs likely dilutional effect.  Would hold off on frequent phlebotomy unless pt reports heavy bleeding.       Clinically improving, however persistent leukocytosis.  Would give a trial of advancing diet to full liquids.  Continue abx, and IV fluids. 79 y/o woman with hx of HTN, Afib not on anticoagulation, family hx of colon cancer (sister), personal hx of colon polyps who was admitted postcolonoscopy for worsening abdominal pain, vomiting, and found to have ascending colitis on CT scan.  Unclear cause of ascending colitis on recent CT scan ? air insuflation, ? from diverticular disease.  Rectal bleeding likely from internal hemorrhoids.  CBC normal.

## 2018-06-23 NOTE — DISCHARGE NOTE ADULT - PATIENT PORTAL LINK FT
You can access the [facility], offered by [facility], by registering with the following website: [email or web address]

## 2018-06-23 NOTE — PROGRESS NOTE ADULT - PROBLEM SELECTOR PLAN 1
Continue Zosyn  Advance to full liquid.  Advance to soft diet and discharge tomorrow if still improving.  GI (Dr. Carpio) following.
Continue Zosyn  Continue clears.  Advance diet tomorrow if she continues improving.  GI (Dr. Carpio) following.
Continue Zosyn  on  full liquid.  blood cult neg   GI (Dr. Carpio) following.
Would give a trial of advancing diet to full liquids only.    Continue abx, and IV fluids.    Ambulation encouraged, out of bed to chair  CBC once a day  Anticipate discharge tomorrow if handles soft diet tomorrow  Will follow with you
1.  Oral antibiotics with lactobacillus  2.  Advance diet to soft and if tolerates then d/c home  3.  Follow up in GI office in one week.

## 2018-06-23 NOTE — PROGRESS NOTE ADULT - ATTENDING COMMENTS
pt seen and examine see above - pt with post coloscopy gi  polyp removal  cause of lower gi bleed also with post procedure  developed ascending colitis - on iv Zosyn , full liquid diet  advance in am if tolerate dc plan . hypokalemia replaced fu repeat electrolyte .

## 2018-06-23 NOTE — DISCHARGE NOTE ADULT - MEDICATION SUMMARY - MEDICATIONS TO TAKE
I will START or STAY ON the medications listed below when I get home from the hospital:    valsartan 160 mg oral tablet  -- 1 tab(s) by mouth once a day  -- Indication: For HTN (hypertension)    methIMAzole 5 mg oral tablet  -- 1 tab(s) by mouth once a day  -- Indication: For Overactive thyroid gland    metoprolol succinate 25 mg oral tablet, extended release  -- 1 tab(s) by mouth once a day  -- Indication: For HTN (hypertension)    amLODIPine 5 mg oral tablet  -- 1 tab(s) by mouth once a day  -- Indication: For HTN (hypertension)    amoxicillin-clavulanate 875 mg-125 mg oral tablet  -- 1 tab(s) by mouth every 12 hours   -- Finish all this medication unless otherwise directed by prescriber.  Take with food or milk.    -- Indication: For Colitis    lactobacillus acidophilus oral capsule  -- 1 tab(s) by mouth once a day  -- Indication: For gi prophy

## 2018-06-23 NOTE — PROGRESS NOTE ADULT - SUBJECTIVE AND OBJECTIVE BOX
Patient is a 78y old  Female who presents with a chief complaint of Ascending Colitis (21 Jun 2018 04:53)      HPI:  79yo F pmhx of paroxysmal atrial fibrillation, hyperthyroidism, htn s/p colonoscopy 6/20/18 with 5 polyps removed, presents with c/o constant nonradiating 10/10 lower abd cramping associated w/ fever, dark bloody loose BM. Patient returned home after colonoscopy and and advanced diet. Patient noticed intense abd cramping and dark bloody loose BMs after eating. Patient denies CP, SOB. Patient is not on AC currently for PAF. Patient sought further medical evaluation at [facility] ED.  admitted post procedure colonoscopy polyp removal  lower gi bleed  now with ascending colitis -    INTERVAL EVENTS:   Patient seen and examined.  pt state  had  episode of dark blood per rectum in am today  , abd pain is getting better , no fever  , tolerating full liquid diet .      INTERVAL HPI/OVERNIGHT EVENTS:  T(C): 36.9 (06-23-18 @ 04:33), Max: 37.2 (06-22-18 @ 16:04)  HR: 69 (06-23-18 @ 04:33) (69 - 84)  BP: 138/75 (06-23-18 @ 04:33) (125/76 - 149/82)  RR: 17 (06-23-18 @ 04:33) (17 - 18)  SpO2: 94% (06-23-18 @ 04:33) (92% - 95%)  Wt(kg): --  I&O's Summary    22 Jun 2018 07:01  -  23 Jun 2018 07:00  --------------------------------------------------------  IN: 175 mL / OUT: 0 mL / NET: 175 mL      REVIEW OF SYSTEMS:    CONSTITUTIONAL: No weakness, fevers or chills  EYES/ENT: No visual changes, no throat pain   RESPIRATORY: No cough, wheezing, hemoptysis; No shortness of breath  CARDIOVASCULAR: No chest pain or palpitations  GASTROINTESTINAL: + mild abdominal discomfort, nausea, vomiting,  No diarrhea or constipation. +dark blood per rectum.  GENITOURINARY: No dysuria, frequency or hematuria  NEUROLOGICAL: No dizziness, numbness, or weakness  SKIN: No itching, burning, rashes, or lesions   All other review of systems is negative unless indicated above.          PHYSICAL EXAM:  GENERAL: NAD, well-groomed, well-developed  HEAD:  Atraumatic, Normocephalic  EYES: EOMI, PERRLA, conjunctiva and sclera clear  ENMT:  Moist mucous membranes, No lesions  NECK: Supple, No JVD,   NERVOUS SYSTEM:  Alert & Oriented X3, Good concentration; Motor Strength 5/5 B/L upper and lower extremities; DTRs 2+ intact and symmetric  CHEST/LUNG: Clear to percussion bilaterally; No rales, rhonchi, wheezing,   HEART: Regular rate and rhythm; No murmurs, no tachy   ABDOMEN: Soft, mild tender lt lower , Nondistended; Bowel sounds present  EXTREMITIES:  2+ Peripheral Pulses, No clubbing, cyanosis, or edema    SKIN: No rashes or lesions    MEDICATIONS  (STANDING):  amLODIPine   Tablet 5 milliGRAM(s) Oral daily  methimazole 5 milliGRAM(s) Oral daily  metoprolol succinate ER 25 milliGRAM(s) Oral daily  piperacillin/tazobactam IVPB. 3.375 Gram(s) IV Intermittent every 8 hours  valsartan 160 milliGRAM(s) Oral daily    MEDICATIONS  (PRN):      LABS:                        13.6   9.40  )-----------( 159      ( 23 Jun 2018 07:22 )             40.6     06-23    145  |  107  |  3<L>  ----------------------------<  91  3.0<L>   |  29  |  0.74    Ca    8.5      23 Jun 2018 07:22          CAPILLARY BLOOD GLUCOSE          06-21 @ 12:16   No growth to date.  --  --          RADIOLOGY & ADDITIONAL TESTS:    Imaging Personally Reviewed:     no new test   Advance Directives:  full code

## 2018-06-23 NOTE — PROGRESS NOTE ADULT - ASSESSMENT
77 yo F pmhx of paroxysmal atrial fibrillation, hyperthyroidism, htn s/p colonoscopy 6/20/18 with 5 polyps removed, presents with c/o constant nonradiating 10/10 lower abd cramping associated w/ fever, dark bloody loose BM, admitted for ascending colitis  sec to post procedure .

## 2018-06-23 NOTE — DISCHARGE NOTE ADULT - ADDITIONAL INSTRUCTIONS
Paige Carpio), Gastroenterology; Internal Medicine  [address] [place] [address]  Phone: [phone]  Fax: [phone]/ fu with in 1wk  .Dr. Shepherd (PMD fu with in 1wk .

## 2018-06-23 NOTE — DISCHARGE NOTE ADULT - CARE PLAN
Principal Discharge DX:	Colitis  Goal:	post procedure s/p iv abx , blood cult neg  Assessment and plan of treatment:	now po abx / fu gi out pt with in 1wk  Secondary Diagnosis:	HTN (hypertension)  Assessment and plan of treatment:	low salt diet continue meds  Secondary Diagnosis:	Afib  Goal:	rate controlled  Assessment and plan of treatment:	stable / pt is not taking any not on AC at home per her own preference

## 2018-06-23 NOTE — PROGRESS NOTE ADULT - SUBJECTIVE AND OBJECTIVE BOX
79 y/o woman with hx of HTN, Afib not on anticoagulation, family hx of colon cancer (sister), personal hx of colon polyps who underwent colonoscopy yesterday and admitted for abdominal pain, vomiting, and found to have ascending colitis on CT scan.      During colonoscopy she was found to have diverticulosis, along with few small sessile polyps are removed by cold snare.  There was a ? polyp vs inverted diverticulum in sigmoid colon s/p bx - pathology negative.  Pathology report for all other polyps were tubular adenoma.      Pt reports after procedure feeling well.  She went home and slept.  After waking up she had a heavy meal and started having worsening abdominal pain associated with rectal bleeding, nausea and vomiting.  She felt feverish/chills.      On arrival to ER she had CT scan and found to have ascending colitis without perforation.  Labs showed elevated WBC and normal H/H.          Today's note:           MEDICATIONS  (STANDING):  amLODIPine   Tablet 5 milliGRAM(s) Oral daily  methimazole 5 milliGRAM(s) Oral daily  metoprolol succinate ER 25 milliGRAM(s) Oral daily  piperacillin/tazobactam IVPB. 3.375 Gram(s) IV Intermittent every 8 hours  valsartan 160 milliGRAM(s) Oral daily    MEDICATIONS  (PRN):        ICU Vital Signs Last 24 Hrs  T(C): 36.7 (23 Jun 2018 11:41), Max: 37.2 (22 Jun 2018 16:04)  T(F): 98 (23 Jun 2018 11:41), Max: 99 (22 Jun 2018 16:04)  HR: 70 (23 Jun 2018 11:41) (69 - 84)  BP: 135/85 (23 Jun 2018 11:41) (125/76 - 149/82)  BP(mean): --  ABP: --  ABP(mean): --  RR: 17 (23 Jun 2018 11:41) (17 - 18)  SpO2: 95% (23 Jun 2018 11:41) (92% - 95%)    PHYSICAL EXAM:      Constitutional:  Comfortable appearing  HEENT: NCAT, anicteric sclera, moist mucous membranes  Respiratory:  CTA b/l, no w/r/r  Cardiovascular:  nl S1, S2, no m/r/g  Gastrointestinal:  Soft, +BS, NT, ND, no hepatosplenomegally  Extremities:  no E/C/C  Neurological:  Alert and orriented x 3.  Skin:  no Jaundice  Lymph Nodes:  no lymphadenopathy in neck, no supraclavicular adenopathy  Musculoskeletal:  normal gait  Psychiatric:  Normal Mood and affect                              13.6   9.40  )-----------( 159      ( 23 Jun 2018 07:22 )             40.6   06-23    145  |  107  |  3<L>  ----------------------------<  91  3.0<L>   |  29  |  0.74    Ca    8.5      23 Jun 2018 07:22 79 y/o woman with hx of HTN, Afib not on anticoagulation, family hx of colon cancer (sister), personal hx of colon polyps who underwent colonoscopy yesterday and admitted for abdominal pain, vomiting, and found to have ascending colitis on CT scan.      During colonoscopy she was found to have diverticulosis, along with few small sessile polyps are removed by cold snare.  There was a ? polyp vs inverted diverticulum in sigmoid colon s/p bx - pathology negative.  Pathology report for all other polyps were tubular adenoma.      Pt reports after procedure feeling well.  She went home and slept.  After waking up she had a heavy meal and started having worsening abdominal pain associated with rectal bleeding, nausea and vomiting.  She felt feverish/chills.      On arrival to ER she had CT scan and found to have ascending colitis without perforation.  Labs showed elevated WBC and normal H/H.          Today's note:   Patient reports feeling well.  She denies having pain.  She says only when she presses hard, she may "feel something" in right side of abdomen.  She had some specks of red blood in stools. She tolerated full liquids today.    by bedside.           MEDICATIONS  (STANDING):  amLODIPine   Tablet 5 milliGRAM(s) Oral daily  methimazole 5 milliGRAM(s) Oral daily  metoprolol succinate ER 25 milliGRAM(s) Oral daily  piperacillin/tazobactam IVPB. 3.375 Gram(s) IV Intermittent every 8 hours  valsartan 160 milliGRAM(s) Oral daily    MEDICATIONS  (PRN):        ICU Vital Signs Last 24 Hrs  T(C): 36.7 (23 Jun 2018 11:41), Max: 37.2 (22 Jun 2018 16:04)  T(F): 98 (23 Jun 2018 11:41), Max: 99 (22 Jun 2018 16:04)  HR: 70 (23 Jun 2018 11:41) (69 - 84)  BP: 135/85 (23 Jun 2018 11:41) (125/76 - 149/82)  BP(mean): --  ABP: --  ABP(mean): --  RR: 17 (23 Jun 2018 11:41) (17 - 18)  SpO2: 95% (23 Jun 2018 11:41) (92% - 95%)    PHYSICAL EXAM:      Constitutional:  Comfortable appearing  Gastrointestinal:  Soft, +BS, Mild tenderness in right side of abdomen, ND, no hepatosplenomegally  Neurological:  Alert and orriented x 3.  Psychiatric:  Normal Mood and affect                              13.6   9.40  )-----------( 159      ( 23 Jun 2018 07:22 )             40.6   06-23    145  |  107  |  3<L>  ----------------------------<  91  3.0<L>   |  29  |  0.74    Ca    8.5      23 Jun 2018 07:22

## 2018-06-23 NOTE — DISCHARGE NOTE ADULT - CARE PROVIDER_API CALL
Paige Carpio), Gastroenterology; Internal Medicine  [address] [place] [address]  Phone: [phone]  Fax: [phone]

## 2018-06-23 NOTE — DISCHARGE NOTE ADULT - PLAN OF CARE
post procedure s/p iv abx , blood cult neg now po abx / fu gi out pt with in 1wk low salt diet continue meds rate controlled stable / pt is not taking any not on AC at home per her own preference

## 2018-06-26 LAB
CULTURE RESULTS: SIGNIFICANT CHANGE UP
CULTURE RESULTS: SIGNIFICANT CHANGE UP
SPECIMEN SOURCE: SIGNIFICANT CHANGE UP
SPECIMEN SOURCE: SIGNIFICANT CHANGE UP

## 2018-08-01 ENCOUNTER — FORM ENCOUNTER (OUTPATIENT)
Age: 78
End: 2018-08-01

## 2018-08-02 ENCOUNTER — APPOINTMENT (OUTPATIENT)
Dept: INTERNAL MEDICINE | Facility: CLINIC | Age: 78
End: 2018-08-02
Payer: MEDICARE

## 2018-08-02 ENCOUNTER — APPOINTMENT (OUTPATIENT)
Dept: RADIOLOGY | Facility: CLINIC | Age: 78
End: 2018-08-02

## 2018-08-02 ENCOUNTER — OUTPATIENT (OUTPATIENT)
Dept: OUTPATIENT SERVICES | Facility: HOSPITAL | Age: 78
LOS: 1 days | End: 2018-08-02
Payer: COMMERCIAL

## 2018-08-02 VITALS
RESPIRATION RATE: 14 BRPM | OXYGEN SATURATION: 96 % | HEART RATE: 82 BPM | BODY MASS INDEX: 25.82 KG/M2 | WEIGHT: 123 LBS | DIASTOLIC BLOOD PRESSURE: 80 MMHG | HEIGHT: 58 IN | SYSTOLIC BLOOD PRESSURE: 140 MMHG

## 2018-08-02 DIAGNOSIS — Z00.8 ENCOUNTER FOR OTHER GENERAL EXAMINATION: ICD-10-CM

## 2018-08-02 DIAGNOSIS — R06.2 WHEEZING: ICD-10-CM

## 2018-08-02 DIAGNOSIS — Z90.89 ACQUIRED ABSENCE OF OTHER ORGANS: Chronic | ICD-10-CM

## 2018-08-02 DIAGNOSIS — Z98.890 OTHER SPECIFIED POSTPROCEDURAL STATES: Chronic | ICD-10-CM

## 2018-08-02 DIAGNOSIS — Z90.49 ACQUIRED ABSENCE OF OTHER SPECIFIED PARTS OF DIGESTIVE TRACT: Chronic | ICD-10-CM

## 2018-08-02 PROCEDURE — 71046 X-RAY EXAM CHEST 2 VIEWS: CPT | Mod: 26

## 2018-08-02 PROCEDURE — 71046 X-RAY EXAM CHEST 2 VIEWS: CPT

## 2018-08-02 PROCEDURE — 99213 OFFICE O/P EST LOW 20 MIN: CPT

## 2018-08-02 RX ORDER — VALSARTAN AND HYDROCHLOROTHIAZIDE 160; 12.5 MG/1; MG/1
160-12.5 TABLET, FILM COATED ORAL
Qty: 90 | Refills: 0 | Status: DISCONTINUED | COMMUNITY
Start: 2016-12-29 | End: 2018-08-02

## 2018-08-02 NOTE — PHYSICAL EXAM
[No Acute Distress] : no acute distress [Well Nourished] : well nourished [Well Developed] : well developed [Well-Appearing] : well-appearing [Normal Rate] : normal rate  [Regular Rhythm] : with a regular rhythm [Soft] : abdomen soft [Non Tender] : non-tender [Normal Affect] : the affect was normal [Normal Insight/Judgement] : insight and judgment were intact [de-identified] : one slight wheeze heard

## 2018-08-02 NOTE — HISTORY OF PRESENT ILLNESS
[FreeTextEntry1] : needs lung check [de-identified] : Pt had NP come to her house and she heard wheeze. She was advised to come in to be checked. Pt had a colonoscopy and afterwards ate a big dinner with vomiting and fever afterwards. Went back to the hospital and dx with colitis and diverticulitis. Was hospitalized for 3 days. She was fine when discharged. Has been constipated which is chronic.

## 2018-08-03 ENCOUNTER — TRANSCRIPTION ENCOUNTER (OUTPATIENT)
Age: 78
End: 2018-08-03

## 2018-10-01 ENCOUNTER — APPOINTMENT (OUTPATIENT)
Dept: INTERNAL MEDICINE | Facility: CLINIC | Age: 78
End: 2018-10-01

## 2018-12-18 ENCOUNTER — EMERGENCY (EMERGENCY)
Facility: HOSPITAL | Age: 78
LOS: 1 days | Discharge: ANOTHER HOSPITAL | End: 2018-12-18
Attending: EMERGENCY MEDICINE | Admitting: EMERGENCY MEDICINE
Payer: COMMERCIAL

## 2018-12-18 ENCOUNTER — INPATIENT (INPATIENT)
Facility: HOSPITAL | Age: 78
LOS: 1 days | Discharge: HOME | DRG: 65 | End: 2018-12-20
Attending: SPECIALIST | Admitting: SPECIALIST
Payer: COMMERCIAL

## 2018-12-18 VITALS
OXYGEN SATURATION: 100 % | DIASTOLIC BLOOD PRESSURE: 85 MMHG | TEMPERATURE: 98 F | SYSTOLIC BLOOD PRESSURE: 177 MMHG | HEART RATE: 83 BPM | RESPIRATION RATE: 18 BRPM

## 2018-12-18 VITALS
TEMPERATURE: 97 F | SYSTOLIC BLOOD PRESSURE: 206 MMHG | HEIGHT: 58 IN | WEIGHT: 125 LBS | OXYGEN SATURATION: 97 % | DIASTOLIC BLOOD PRESSURE: 105 MMHG | RESPIRATION RATE: 14 BRPM | HEART RATE: 85 BPM

## 2018-12-18 DIAGNOSIS — Z98.890 OTHER SPECIFIED POSTPROCEDURAL STATES: Chronic | ICD-10-CM

## 2018-12-18 DIAGNOSIS — Z90.89 ACQUIRED ABSENCE OF OTHER ORGANS: Chronic | ICD-10-CM

## 2018-12-18 DIAGNOSIS — Z90.49 ACQUIRED ABSENCE OF OTHER SPECIFIED PARTS OF DIGESTIVE TRACT: Chronic | ICD-10-CM

## 2018-12-18 LAB
ALBUMIN SERPL ELPH-MCNC: 4 G/DL — SIGNIFICANT CHANGE UP (ref 3.3–5)
ALBUMIN SERPL ELPH-MCNC: 4.5 G/DL — SIGNIFICANT CHANGE UP (ref 3.3–5)
ALP SERPL-CCNC: 118 U/L — SIGNIFICANT CHANGE UP (ref 40–120)
ALP SERPL-CCNC: 128 U/L — HIGH (ref 40–120)
ALT FLD-CCNC: 17 U/L — SIGNIFICANT CHANGE UP (ref 10–45)
ALT FLD-CCNC: 24 U/L — SIGNIFICANT CHANGE UP (ref 12–78)
ANION GAP SERPL CALC-SCNC: 12 MMOL/L — SIGNIFICANT CHANGE UP (ref 5–17)
ANION GAP SERPL CALC-SCNC: 5 MMOL/L — SIGNIFICANT CHANGE UP (ref 5–17)
APPEARANCE UR: CLEAR — SIGNIFICANT CHANGE UP
APTT BLD: 27.8 SEC — SIGNIFICANT CHANGE UP (ref 27.5–36.3)
APTT BLD: 29.3 SEC — SIGNIFICANT CHANGE UP (ref 28.5–37)
AST SERPL-CCNC: 22 U/L — SIGNIFICANT CHANGE UP (ref 15–37)
AST SERPL-CCNC: 25 U/L — SIGNIFICANT CHANGE UP (ref 10–40)
BACTERIA # UR AUTO: NEGATIVE — SIGNIFICANT CHANGE UP
BASOPHILS # BLD AUTO: 0 K/UL — SIGNIFICANT CHANGE UP (ref 0–0.2)
BASOPHILS # BLD AUTO: 0.02 K/UL — SIGNIFICANT CHANGE UP (ref 0–0.2)
BASOPHILS NFR BLD AUTO: 0.4 % — SIGNIFICANT CHANGE UP (ref 0–2)
BASOPHILS NFR BLD AUTO: 0.4 % — SIGNIFICANT CHANGE UP (ref 0–2)
BILIRUB SERPL-MCNC: 0.4 MG/DL — SIGNIFICANT CHANGE UP (ref 0.2–1.2)
BILIRUB SERPL-MCNC: 0.4 MG/DL — SIGNIFICANT CHANGE UP (ref 0.2–1.2)
BILIRUB UR-MCNC: NEGATIVE — SIGNIFICANT CHANGE UP
BUN SERPL-MCNC: 16 MG/DL — SIGNIFICANT CHANGE UP (ref 7–23)
BUN SERPL-MCNC: 16 MG/DL — SIGNIFICANT CHANGE UP (ref 7–23)
CALCIUM SERPL-MCNC: 8.7 MG/DL — SIGNIFICANT CHANGE UP (ref 8.5–10.1)
CALCIUM SERPL-MCNC: 9.3 MG/DL — SIGNIFICANT CHANGE UP (ref 8.4–10.5)
CHLORIDE SERPL-SCNC: 104 MMOL/L — SIGNIFICANT CHANGE UP (ref 96–108)
CHLORIDE SERPL-SCNC: 108 MMOL/L — SIGNIFICANT CHANGE UP (ref 96–108)
CO2 SERPL-SCNC: 26 MMOL/L — SIGNIFICANT CHANGE UP (ref 22–31)
CO2 SERPL-SCNC: 29 MMOL/L — SIGNIFICANT CHANGE UP (ref 22–31)
COLOR SPEC: COLORLESS — SIGNIFICANT CHANGE UP
CREAT SERPL-MCNC: 0.66 MG/DL — SIGNIFICANT CHANGE UP (ref 0.5–1.3)
CREAT SERPL-MCNC: 0.79 MG/DL — SIGNIFICANT CHANGE UP (ref 0.5–1.3)
DIFF PNL FLD: NEGATIVE — SIGNIFICANT CHANGE UP
EOSINOPHIL # BLD AUTO: 0.02 K/UL — SIGNIFICANT CHANGE UP (ref 0–0.5)
EOSINOPHIL # BLD AUTO: 0.1 K/UL — SIGNIFICANT CHANGE UP (ref 0–0.5)
EOSINOPHIL NFR BLD AUTO: 0.4 % — SIGNIFICANT CHANGE UP (ref 0–6)
EOSINOPHIL NFR BLD AUTO: 1.9 % — SIGNIFICANT CHANGE UP (ref 0–6)
EPI CELLS # UR: 1 /HPF — SIGNIFICANT CHANGE UP
GLUCOSE SERPL-MCNC: 122 MG/DL — HIGH (ref 70–99)
GLUCOSE SERPL-MCNC: 127 MG/DL — HIGH (ref 70–99)
GLUCOSE UR QL: NEGATIVE — SIGNIFICANT CHANGE UP
HCT VFR BLD CALC: 43.6 % — SIGNIFICANT CHANGE UP (ref 34.5–45)
HCT VFR BLD CALC: 44.1 % — SIGNIFICANT CHANGE UP (ref 34.5–45)
HGB BLD-MCNC: 14.3 G/DL — SIGNIFICANT CHANGE UP (ref 11.5–15.5)
HGB BLD-MCNC: 15.2 G/DL — SIGNIFICANT CHANGE UP (ref 11.5–15.5)
HYALINE CASTS # UR AUTO: 0 /LPF — SIGNIFICANT CHANGE UP (ref 0–2)
IMM GRANULOCYTES NFR BLD AUTO: 0.4 % — SIGNIFICANT CHANGE UP (ref 0–1.5)
INR BLD: 0.94 RATIO — SIGNIFICANT CHANGE UP (ref 0.88–1.16)
INR BLD: 0.96 RATIO — SIGNIFICANT CHANGE UP (ref 0.88–1.16)
KETONES UR-MCNC: NEGATIVE — SIGNIFICANT CHANGE UP
LEUKOCYTE ESTERASE UR-ACNC: ABNORMAL
LYMPHOCYTES # BLD AUTO: 0.98 K/UL — LOW (ref 1–3.3)
LYMPHOCYTES # BLD AUTO: 1 K/UL — SIGNIFICANT CHANGE UP (ref 1–3.3)
LYMPHOCYTES # BLD AUTO: 17.2 % — SIGNIFICANT CHANGE UP (ref 13–44)
LYMPHOCYTES # BLD AUTO: 17.6 % — SIGNIFICANT CHANGE UP (ref 13–44)
MCHC RBC-ENTMCNC: 29.5 PG — SIGNIFICANT CHANGE UP (ref 27–34)
MCHC RBC-ENTMCNC: 30.9 PG — SIGNIFICANT CHANGE UP (ref 27–34)
MCHC RBC-ENTMCNC: 32.8 GM/DL — SIGNIFICANT CHANGE UP (ref 32–36)
MCHC RBC-ENTMCNC: 34.4 GM/DL — SIGNIFICANT CHANGE UP (ref 32–36)
MCV RBC AUTO: 89.8 FL — SIGNIFICANT CHANGE UP (ref 80–100)
MCV RBC AUTO: 89.9 FL — SIGNIFICANT CHANGE UP (ref 80–100)
MONOCYTES # BLD AUTO: 0.3 K/UL — SIGNIFICANT CHANGE UP (ref 0–0.9)
MONOCYTES # BLD AUTO: 0.38 K/UL — SIGNIFICANT CHANGE UP (ref 0–0.9)
MONOCYTES NFR BLD AUTO: 5.4 % — SIGNIFICANT CHANGE UP (ref 2–14)
MONOCYTES NFR BLD AUTO: 6.7 % — SIGNIFICANT CHANGE UP (ref 2–14)
NEUTROPHILS # BLD AUTO: 4.29 K/UL — SIGNIFICANT CHANGE UP (ref 1.8–7.4)
NEUTROPHILS # BLD AUTO: 4.4 K/UL — SIGNIFICANT CHANGE UP (ref 1.8–7.4)
NEUTROPHILS NFR BLD AUTO: 74.8 % — SIGNIFICANT CHANGE UP (ref 43–77)
NEUTROPHILS NFR BLD AUTO: 74.9 % — SIGNIFICANT CHANGE UP (ref 43–77)
NITRITE UR-MCNC: NEGATIVE — SIGNIFICANT CHANGE UP
NRBC # BLD: 0 /100 WBCS — SIGNIFICANT CHANGE UP (ref 0–0)
PH UR: 7.5 — SIGNIFICANT CHANGE UP (ref 5–8)
PLATELET # BLD AUTO: 181 K/UL — SIGNIFICANT CHANGE UP (ref 150–400)
PLATELET # BLD AUTO: 182 K/UL — SIGNIFICANT CHANGE UP (ref 150–400)
POTASSIUM SERPL-MCNC: 4.1 MMOL/L — SIGNIFICANT CHANGE UP (ref 3.5–5.3)
POTASSIUM SERPL-MCNC: 4.6 MMOL/L — SIGNIFICANT CHANGE UP (ref 3.5–5.3)
POTASSIUM SERPL-SCNC: 4.1 MMOL/L — SIGNIFICANT CHANGE UP (ref 3.5–5.3)
POTASSIUM SERPL-SCNC: 4.6 MMOL/L — SIGNIFICANT CHANGE UP (ref 3.5–5.3)
PROT SERPL-MCNC: 7.8 G/DL — SIGNIFICANT CHANGE UP (ref 6–8.3)
PROT SERPL-MCNC: 8.2 G/DL — SIGNIFICANT CHANGE UP (ref 6–8.3)
PROT UR-MCNC: NEGATIVE — SIGNIFICANT CHANGE UP
PROTHROM AB SERPL-ACNC: 10.8 SEC — SIGNIFICANT CHANGE UP (ref 10–12.9)
PROTHROM AB SERPL-ACNC: 10.9 SEC — SIGNIFICANT CHANGE UP (ref 10–12.9)
RBC # BLD: 4.85 M/UL — SIGNIFICANT CHANGE UP (ref 3.8–5.2)
RBC # BLD: 4.91 M/UL — SIGNIFICANT CHANGE UP (ref 3.8–5.2)
RBC # FLD: 13.3 % — SIGNIFICANT CHANGE UP (ref 10.3–14.5)
RBC # FLD: 13.5 % — SIGNIFICANT CHANGE UP (ref 10.3–14.5)
RBC CASTS # UR COMP ASSIST: 1 /HPF — SIGNIFICANT CHANGE UP (ref 0–4)
SODIUM SERPL-SCNC: 142 MMOL/L — SIGNIFICANT CHANGE UP (ref 135–145)
SODIUM SERPL-SCNC: 142 MMOL/L — SIGNIFICANT CHANGE UP (ref 135–145)
SP GR SPEC: 1.01 — LOW (ref 1.01–1.02)
TROPONIN T, HIGH SENSITIVITY RESULT: <6 NG/L — SIGNIFICANT CHANGE UP (ref 0–51)
UROBILINOGEN FLD QL: NEGATIVE — SIGNIFICANT CHANGE UP
WBC # BLD: 5.71 K/UL — SIGNIFICANT CHANGE UP (ref 3.8–10.5)
WBC # BLD: 5.9 K/UL — SIGNIFICANT CHANGE UP (ref 3.8–10.5)
WBC # FLD AUTO: 5.71 K/UL — SIGNIFICANT CHANGE UP (ref 3.8–10.5)
WBC # FLD AUTO: 5.9 K/UL — SIGNIFICANT CHANGE UP (ref 3.8–10.5)
WBC UR QL: 1 /HPF — SIGNIFICANT CHANGE UP (ref 0–5)

## 2018-12-18 PROCEDURE — 85730 THROMBOPLASTIN TIME PARTIAL: CPT

## 2018-12-18 PROCEDURE — 93010 ELECTROCARDIOGRAM REPORT: CPT

## 2018-12-18 PROCEDURE — 80053 COMPREHEN METABOLIC PANEL: CPT

## 2018-12-18 PROCEDURE — 99285 EMERGENCY DEPT VISIT HI MDM: CPT

## 2018-12-18 PROCEDURE — 99285 EMERGENCY DEPT VISIT HI MDM: CPT | Mod: 25

## 2018-12-18 PROCEDURE — 85610 PROTHROMBIN TIME: CPT

## 2018-12-18 PROCEDURE — 93005 ELECTROCARDIOGRAM TRACING: CPT

## 2018-12-18 PROCEDURE — 36415 COLL VENOUS BLD VENIPUNCTURE: CPT

## 2018-12-18 PROCEDURE — 71045 X-RAY EXAM CHEST 1 VIEW: CPT | Mod: 26

## 2018-12-18 PROCEDURE — 71045 X-RAY EXAM CHEST 1 VIEW: CPT

## 2018-12-18 PROCEDURE — 99220: CPT

## 2018-12-18 PROCEDURE — 85027 COMPLETE CBC AUTOMATED: CPT

## 2018-12-18 PROCEDURE — 70450 CT HEAD/BRAIN W/O DYE: CPT | Mod: 26

## 2018-12-18 RX ORDER — AMLODIPINE BESYLATE 2.5 MG/1
5 TABLET ORAL DAILY
Qty: 0 | Refills: 0 | Status: DISCONTINUED | OUTPATIENT
Start: 2018-12-18 | End: 2018-12-20

## 2018-12-18 RX ORDER — LOSARTAN POTASSIUM 100 MG/1
50 TABLET, FILM COATED ORAL DAILY
Qty: 0 | Refills: 0 | Status: DISCONTINUED | OUTPATIENT
Start: 2018-12-18 | End: 2018-12-19

## 2018-12-18 RX ORDER — ASPIRIN/CALCIUM CARB/MAGNESIUM 324 MG
324 TABLET ORAL DAILY
Qty: 0 | Refills: 0 | Status: DISCONTINUED | OUTPATIENT
Start: 2018-12-18 | End: 2018-12-19

## 2018-12-18 RX ORDER — METOPROLOL TARTRATE 50 MG
25 TABLET ORAL DAILY
Qty: 0 | Refills: 0 | Status: DISCONTINUED | OUTPATIENT
Start: 2018-12-18 | End: 2018-12-20

## 2018-12-18 RX ORDER — SODIUM CHLORIDE 9 MG/ML
3 INJECTION INTRAMUSCULAR; INTRAVENOUS; SUBCUTANEOUS EVERY 8 HOURS
Qty: 0 | Refills: 0 | Status: DISCONTINUED | OUTPATIENT
Start: 2018-12-18 | End: 2018-12-20

## 2018-12-18 RX ORDER — ASPIRIN/CALCIUM CARB/MAGNESIUM 324 MG
81 TABLET ORAL DAILY
Qty: 0 | Refills: 0 | Status: DISCONTINUED | OUTPATIENT
Start: 2018-12-18 | End: 2018-12-20

## 2018-12-18 RX ADMIN — Medication 324 MILLIGRAM(S): at 22:13

## 2018-12-18 NOTE — ED PROVIDER NOTE - PROGRESS NOTE DETAILS
Neurology consulted, saw the CT said it was negative, symptoms likely related to migraine and pt may follow up with Dr Austin as an out patient [phone].  Salima

## 2018-12-18 NOTE — CONSULT NOTE ADULT - ASSESSMENT
78 Y Female with PMH of HTN, migraine -only aura,  was transfer from [facility] for CT head. As per patient, she had 3 episodes where she developed right sided facial numbness/slurred speech. Each episodes lasted for 5-10 minutes. She also had her usual scintillating scotoma for last 2 days. She denied any headache. Each episodes resolved spontaneously and denied prior similar episodes. Her NIHSS was 0 and MRS was 0. tPa was not given as she had no neurological deficits. CT head was unremarkable.     Impression     Migraine aura   However, due to her age and risk factors, she will be benefited by outpatient MRI brain     Plan     MRI brain as outpatient   Follow up with

## 2018-12-18 NOTE — ED ADULT NURSE NOTE - CHPI ED NUR SYMPTOMS NEG
no vomiting/no blurred vision/no change in level of consciousness/no fever/no dizziness/no confusion/no nausea/no numbness/no loss of consciousness/no weakness

## 2018-12-18 NOTE — CONSULT NOTE ADULT - ATTENDING COMMENTS
VASCULAR NEUROLOGY ATTENDING  The patient is seen and examined the history and imaging are reviewed. I agree with the resident note unless otherwise noted. Patient with L thalamic infarct known prior history of PAF. Etiology unclear possible small vessel vs cardioembolism. Given history of PAF and now elevated CHADSVASC would advocate for AC. Will need close outpatient cardiology (we are attempting to contact patients outpatient cardiologist Dr. George) and neurology follow-up.

## 2018-12-18 NOTE — CONSULT NOTE ADULT - SUBJECTIVE AND OBJECTIVE BOX
HPI:    78 Y Female with PMH of HTN, migraine -only aura,  was transfer from [facility] for CT head. As per patient, she had 3 episodes where she developed right sided facial numbness/slurred speech. Each episodes lasted for 5-10 minutes. She also had her usual scintillating scotoma for last 2 days. She denied any headache. Each episodes resolved spontaneously and denied prior similar episodes. Her NIHSS was 0 and MRS was 0. tPa was not given as she had no neurological deficits.       MEDICATIONS  (STANDING):    MEDICATIONS  (PRN):      PAST MEDICAL & SURGICAL HISTORY:  Overactive thyroid gland  Afib  HTN (hypertension)  History of tonsillectomy  S/P appendectomy  H/O colonoscopy: 5 polyups removed      FAMILY HISTORY:  No pertinent family history in first degree relatives      Allergies    No Known Allergies    Intolerances          SHx - No smoking, No ETOH, No drug abuse      Review of Systems:  CONSTITUTIONAL:  No weight loss, fever, chills, weakness or fatigue.  HEENT:  Eyes:  No visual loss, blurred vision, double vision or yellow sclerae. Ears, Nose, Throat:  No hearing loss, sneezing, congestion, runny nose or sore throat.  SKIN:  No rash or itching.  CARDIOVASCULAR:  No chest pain, chest pressure or chest discomfort. No palpitations or edema.  RESPIRATORY:  No shortness of breath, cough or sputum.  GASTROINTESTINAL:  No anorexia, nausea, vomiting or diarrhea. No abdominal pain or blood.  GENITOURINARY:  NO Burning on urination.   NEUROLOGICAL: See HPI  MUSCULOSKELETAL:  No muscle, back pain, joint pain or stiffness.  HEMATOLOGIC:  No anemia, bleeding or bruising.  LYMPHATICS:  No enlarged nodes. No history of splenectomy.  PSYCHIATRIC:  No history of depression or anxiety.  ENDOCRINOLOGIC:  No reports of sweating, cold or heat intolerance. No polyuria or polydipsia.  ALLERGIES:  No history of asthma, hives, eczema or rhinitis.        Vital Signs Last 24 Hrs  T(C): 36.7 (18 Dec 2018 20:40), Max: 36.7 (18 Dec 2018 20:40)  T(F): 98.1 (18 Dec 2018 20:40), Max: 98.1 (18 Dec 2018 20:40)  HR: 83 (18 Dec 2018 20:40) (83 - 85)  BP: 177/85 (18 Dec 2018 20:40) (177/85 - 206/105)  BP(mean): --  RR: 18 (18 Dec 2018 20:40) (14 - 18)  SpO2: 100% (18 Dec 2018 20:40) (97% - 100%)    General Exam:   General appearance: No acute distress                   Neurological Exam:    Mental Status: Orientated to self, date and place.  Attention intact.  No dysarthria, aphasia or neglect.  Cranial Nerves: PERRL, EOMI, CN V1-3 intact to light touch and pinprick.  No facial asymmetry, Tongue, uvula and palate midline.    Motor:   Tone: normal.                  Strength:   intact without any drifts   Dysmetria: None to finger-nose-finger or heel-shin-heel  No truncal ataxia.    Tremor: No resting, postural or action tremor.  No myoclonus.  Sensation: intact to light touch  Deep Tendon Reflexes: 1+ bilateral biceps, triceps, brachioradialis, knee   Toes flexor bilaterally  Gait: normal and stable.      Other:    12-18    142  |  104  |  16  ----------------------------<  127<H>  4.1   |  26  |  0.66    Ca    9.3      18 Dec 2018 20:27    TPro  7.8  /  Alb  4.5  /  TBili  0.4  /  DBili  x   /  AST  25  /  ALT  17  /  AlkPhos  118  12-18 12-18    142  |  104  |  16  ----------------------------<  127<H>  4.1   |  26  |  0.66    Ca    9.3      18 Dec 2018 20:27    TPro  7.8  /  Alb  4.5  /  TBili  0.4  /  DBili  x   /  AST  25  /  ALT  17  /  AlkPhos  118  12-18                          15.2   5.9   )-----------( 181      ( 18 Dec 2018 20:27 )             44.1       Radiology    CT head     unremarkable

## 2018-12-18 NOTE — ED ADULT NURSE NOTE - NSIMPLEMENTINTERV_GEN_ALL_ED
Implemented All Universal Safety Interventions:  [place] to call system. Call bell, personal items and telephone within reach. Instruct patient to call for assistance. Room bathroom lighting operational. Non-slip footwear when patient is off stretcher. Physically safe environment: no spills, clutter or unnecessary equipment. Stretcher in lowest position, wheels locked, appropriate side rails in place.

## 2018-12-18 NOTE — ED PROVIDER NOTE - MEDICAL DECISION MAKING DETAILS
Pt with intermittent slurred speech and facial weakness over 24 hors no fall no sz or diplopia or other visual changes no n/v no headache Alert no distress YUDITH EOM wnl heart regular s1s2 no carotid bruit sensory and motor intact no dysphagia CT head neg for any acute changes will do observation in CDU and obtain an MRI neuro check ASA --Andrea

## 2018-12-18 NOTE — ED PROVIDER NOTE - NEUROLOGICAL, MLM
Alert and oriented, no focal deficits, no motor or sensory deficits.  CN 2-12 intact.  Coordination intact.  5/5 strength b/l UE and LE.  Negative Romberg

## 2018-12-18 NOTE — ED PROVIDER NOTE - PROGRESS NOTE DETAILS
CT at [place] not functioning, will attempt to arrange xfer d/w dr lima ([facility]) will accept xfer er->er d/w dr marrufo ([facility]) will accept xfer er->er

## 2018-12-18 NOTE — ED ADULT TRIAGE NOTE - CHIEF COMPLAINT QUOTE
patient came in ED from [facility] with c/o intermittent numbness to right side of the face X 17:00H. no facial droop, no arm drift, no slurred speech

## 2018-12-18 NOTE — ED PROVIDER NOTE - ATTENDING CONTRIBUTION TO CARE
I have seen and evaluated this patient with the advance practice clinician.   I agree with the findings  unless other wise stated. I have amended notes where needed.  After my face to face bedside evaluation, I am noting: Pt with intermittent slurred speech and facial weakness over 24 hors no fall no sz or diplopia or other visual changes no n/v no headache Alert no distress YUDITH EOM wnl heart regular s1s2 no carotid bruit sensory and motor intact no dysphagia CT head neg for any acute changes will do observation in CDU and obtain an MRI neuro check ASA --Andrea

## 2018-12-18 NOTE — ED ADULT NURSE NOTE - OBJECTIVE STATEMENT
77 yo f transferred from [place] for facial numbness. PMH of Atrial fibrillation & HTN. As per EMS, pt has had 3 episodes of resolving right facial droop & numbness with slurred speech, each episode lasting 5 minutes. Symptoms started at 11:00 AM on 12/18/18. pt reported to [place] ED, CT scan was down, and pt was transferred here for CT head. On arrival, CODE STROKE INITIATED @ 20:18. MD Andrea & two RNs at bedside. Blood Sugar of 126. Pt only complaint in "flickering of lights". On exam, A&Ox3. PERRL. No facial droop noted. No slurred speech. Pt upper and lower extremities bilateral in strength. Pt denies headache, dizziness, blurred vision, weakness, numbness/tingling, chest pain, SOB, n/v, abdominal pain, fevers, & chills. 20 gauge in LAC. Pt straight to CT.

## 2018-12-18 NOTE — ED PROVIDER NOTE - CHPI ED SYMPTOMS NEG
no change in level of consciousness/no fever/no vomiting/no dizziness/no blurred vision/no loss of consciousness/no nausea

## 2018-12-18 NOTE — ED ADULT NURSE NOTE - CHIEF COMPLAINT QUOTE
patient came in ED from [facility] with c/o intermittent numbness to right side of the face X 17:00H. no facial droop, no arm drift, no slurred speech

## 2018-12-18 NOTE — ED PROVIDER NOTE - OBJECTIVE STATEMENT
78 y.o. female coming in with 3 episodes of slurred speech and right sided facial droop.  First episode was at 11am and spontaneously resolved.  Last episode was 5 hours ago which had also spontaneously resolved.  Pt currently feeling well, no numbness, weakness, or difficulty speaking.  Pt is telling us she has a history of ocular migraines (although has never seen a neurologist for this diagnosis) and that she has been having one for over a day.  Nothing made her symptoms worse and they got better on their own .

## 2018-12-18 NOTE — ED PROVIDER NOTE - OBJECTIVE STATEMENT
pt c/o 3 episodes of right facial numbness with slurring of speech lasting approx 5 minutes each, occurred at approx 1500, 1700 and again at 1900. no current symptoms. no fevers, chills, ha, d/n/v, cp, sob, abd pain.

## 2018-12-19 DIAGNOSIS — R47.81 SLURRED SPEECH: ICD-10-CM

## 2018-12-19 LAB
CHOLEST SERPL-MCNC: 180 MG/DL — SIGNIFICANT CHANGE UP (ref 10–199)
CULTURE RESULTS: SIGNIFICANT CHANGE UP
HBA1C BLD-MCNC: 5.7 % — HIGH (ref 4–5.6)
HDLC SERPL-MCNC: 69 MG/DL — SIGNIFICANT CHANGE UP
LIPID PNL WITH DIRECT LDL SERPL: 104 MG/DL — SIGNIFICANT CHANGE UP
SPECIMEN SOURCE: SIGNIFICANT CHANGE UP
TOTAL CHOLESTEROL/HDL RATIO MEASUREMENT: 2.6 RATIO — LOW (ref 3.3–7.1)
TRIGL SERPL-MCNC: 34 MG/DL — SIGNIFICANT CHANGE UP (ref 10–149)

## 2018-12-19 PROCEDURE — 99223 1ST HOSP IP/OBS HIGH 75: CPT

## 2018-12-19 PROCEDURE — 70549 MR ANGIOGRAPH NECK W/O&W/DYE: CPT | Mod: 26

## 2018-12-19 PROCEDURE — 70551 MRI BRAIN STEM W/O DYE: CPT | Mod: 26

## 2018-12-19 PROCEDURE — 99217: CPT

## 2018-12-19 RX ORDER — ENOXAPARIN SODIUM 100 MG/ML
40 INJECTION SUBCUTANEOUS EVERY 24 HOURS
Qty: 0 | Refills: 0 | Status: DISCONTINUED | OUTPATIENT
Start: 2018-12-19 | End: 2018-12-20

## 2018-12-19 RX ADMIN — LOSARTAN POTASSIUM 50 MILLIGRAM(S): 100 TABLET, FILM COATED ORAL at 12:17

## 2018-12-19 RX ADMIN — SODIUM CHLORIDE 3 MILLILITER(S): 9 INJECTION INTRAMUSCULAR; INTRAVENOUS; SUBCUTANEOUS at 06:22

## 2018-12-19 RX ADMIN — Medication 25 MILLIGRAM(S): at 18:40

## 2018-12-19 RX ADMIN — ENOXAPARIN SODIUM 40 MILLIGRAM(S): 100 INJECTION SUBCUTANEOUS at 18:40

## 2018-12-19 RX ADMIN — SODIUM CHLORIDE 3 MILLILITER(S): 9 INJECTION INTRAMUSCULAR; INTRAVENOUS; SUBCUTANEOUS at 15:00

## 2018-12-19 RX ADMIN — SODIUM CHLORIDE 3 MILLILITER(S): 9 INJECTION INTRAMUSCULAR; INTRAVENOUS; SUBCUTANEOUS at 21:02

## 2018-12-19 RX ADMIN — AMLODIPINE BESYLATE 5 MILLIGRAM(S): 2.5 TABLET ORAL at 12:17

## 2018-12-19 RX ADMIN — Medication 81 MILLIGRAM(S): at 18:40

## 2018-12-19 NOTE — H&P ADULT - NSHPREVIEWOFSYSTEMS_GEN_ALL_CORE
CONSTITUTIONAL:  No weight loss, fever, chills, weakness or fatigue.  HEENT:  Eyes:  No visual loss, blurred vision, double vision or yellow sclerae. Ears, Nose, Throat:  No hearing loss, sneezing, congestion, runny nose or sore throat.  SKIN:  No rash or itching.  CARDIOVASCULAR:  No chest pain, chest pressure or chest discomfort. No palpitations or edema.  RESPIRATORY:  No shortness of breath, cough or sputum.  GASTROINTESTINAL:  No anorexia, nausea, vomiting or diarrhea. No abdominal pain or blood.  GENITOURINARY:  NO Burning on urination.   NEUROLOGICAL: See HPI  MUSCULOSKELETAL:  No muscle, back pain, joint pain or stiffness.  HEMATOLOGIC:  No anemia, bleeding or bruising.  LYMPHATICS:  No enlarged nodes. No history of splenectomy.  PSYCHIATRIC:  No history of depression or anxiety.  ENDOCRINOLOGIC:  No reports of sweating, cold or heat intolerance. No polyuria or polydipsia.  ALLERGIES:  No history of asthma, hives, eczema or rhinitis.

## 2018-12-19 NOTE — ED ADULT NURSE REASSESSMENT NOTE - NS ED NURSE REASSESS COMMENT FT1
16.30 hrs pt has bed GCS 15/15 TREVOR Strong upper & lower extremity strength No facial droop    A&OX4 steady gait C/O facial numbness in right cheek Slurred speech improved Pt has bed has stable vitals  Report given to floor Pt stable to go to floor
20:45. Pt ambulating to restroom with no difficulty. Gait is steady. Pt denies chest pain, palpitations, or dizziness at this time.
PA from CDU at bedside evaluating pt at this time.
Pt received from KERWIN Huynh. Pt oriented to CDU & plan of care was discussed. Pt A&O x 4. Pt in CDU for neuro checks q 4 and awaiting MRI. Pt neuro check intact, no deficits noted as of now. Pt on cardiac monitor in NSR, HR in 70's. Pt denies headache, dizziness, or lightheadedness. Safety & comfort measures maintained. Call bell in reach. Will continue to monitor.
Pt neuro check intact, no deficits noted as of now.

## 2018-12-19 NOTE — ED CDU PROVIDER INITIAL DAY NOTE - OBJECTIVE STATEMENT
78 y.o. female with PMH Afib, HTN, hyperthyroid, c/o 3 episodes of slurred speech and right sided facial droop today.  states she has had visual floaters all day today. First episode of slurred speech and R facial droop was at 11am and spontaneously resolved.  Last episode was 5 hours prior to ED arrival which had also spontaneously resolved. Symptoms associated with  R face and chest numbness, R hand tingling, and leaning to R side when walking. Pt currently feeling well. Nothing made her symptoms worse and they got better on their own. Denies fever, chills, sweats, N/V/D, weakness, CP, abd pain, back pain, problems going to the bathroom.   In ED, labs unremarkable, CTH negative, CXR negative. pt was code stroke, neuro saw pt, think it is likely a migraine, but considering risk factors will r/o stroke. pt sent to CDU for tele, neuro checks, MRI/MRA.     PMD Dr. Radha Gama

## 2018-12-19 NOTE — ED CDU PROVIDER SUBSEQUENT DAY NOTE - HISTORY
No interval changes since initial CDU provider note. Pt feels well without complaint. NAD, VSS. no events on tele. Neuro exam normal, no deficits. pt to get MRI/MRA in AM. will continue monitoring. Marian Estrada

## 2018-12-19 NOTE — ED CDU PROVIDER SUBSEQUENT DAY NOTE - PROGRESS NOTE DETAILS
CDU NOTE SACHIN RIVERA: Pt resting comfortably, feeling well without complaint. no floaters currently. NAD, VSS. No events on telemetry. Neuro exam normal, no deficits, no facial droop or slurred speech. will continue monitoring. Patient resting in bed comfortably in NAD. No new episodes of slurred speech or facial droop, but patient reports she had subjective numbness around her mouth and saw "fog" earlier this AM that has since resolved. Denies any HA, dizziness, any other numbness/tingling, unsteady gait. Most recent VSS. No events on telemetry monitor. Neuro exam WNL. Pending MRI/MRA today. Called by patient's daughter, Hailey. Reports patient with very notable R facial droop, slurred speech, and leaning on side when walking yesterday x 30 mins. pt continues to have dysarthria and paresthesia Right face.  denies other neuro sympt.  + MRI - CVA - pt with hx of A. fib.  Pt was evaluated by Neurology - Dr. Thurston - request pt's cardiologist is contacted and arrange w/u for CVA before d/c.  Pt stable for D/C. Attempted to reach patient's cardiologist, Dr. George, however on vacation until 12/28. Left message for call back by covering doctor, Dr. Mcbride. Called back by office of Dr. George, requesting that patient have TTE in hospital, unable to schedule rapid follow up. Paged neuro. Pt offered cardiology f/u by Dr. Thurston, however prefers to see her own cardiologist. Attempted to reach patient's cardiologist, Dr. George, however on vacation until 12/28. Left message for call back by covering doctor, Dr. Mcbride. Awaiting cardiology call back before initiating AC therapy. Called back by office of Dr. George, requesting that patient have TTE in hospital, unable to schedule rapid follow up. Paged neuro.  Patient resting comfortably in NAD. VSS. No events on telemetry. Spoke with Dr. Thurston, patient to be admitted to stroke neuro. D/w Dr. Leach.

## 2018-12-19 NOTE — H&P ADULT - NSHPPHYSICALEXAM_GEN_ALL_CORE
General Exam:   General appearance: No acute distress                   Neurological Exam:    Mental Status: Orientated to self, date and place.  Attention intact.  No dysarthria, aphasia or neglect.  Cranial Nerves: PERRL, EOMI, CN V1-3 intact to light touch and pinprick.  No facial asymmetry, Tongue, uvula and palate midline.    Motor:   Tone: normal.                  Strength:   intact without any drifts   Dysmetria: None to finger-nose-finger or heel-shin-heel  No truncal ataxia.    Tremor: No resting, postural or action tremor.  No myoclonus.  Sensation: intact to light touch  Deep Tendon Reflexes: 1+ bilateral biceps, triceps, brachioradialis, knee   Toes flexor bilaterally  Gait: normal and stable.

## 2018-12-19 NOTE — H&P ADULT - ASSESSMENT
Impression: Left thalamic infarct. Etiology likely 2/2 small vessel disease due to HTN. Also with hx of paroxysmal a-fib, needs AC given CHADS score of 4. Need to classify a-fib prior to starting AC ( warfarin vs NOACs).    Plan:  - TTE w/ bubble study to r/o valvular a-fib  - c/w Aspirin 81mg PO QD   - start lipitor 40mg PO HS  - Permissive HTN for 24 hours  - DVT ppx  - plan to start apixaban prior to d/c if TTE does not show valvular cause for a-fib

## 2018-12-19 NOTE — ED CDU PROVIDER INITIAL DAY NOTE - ATTENDING CONTRIBUTION TO CARE
I have seen and evaluated this patient with the advance practice clinician.   I agree with the findings  unless other wise stated. I have amended notes where needed.  After my face to face bedside evaluation, I am noting: : Pt with intermittent slurred speech and facial weakness over 24 hors no fall no sz or diplopia or other visual changes no n/v no headache Alert no distress YUDITH EOM wnl heart regular s1s2 no carotid bruit sensory and motor intact no dysphagia CT head neg for any acute changes will do observation in CDU and obtain an MRI neuro check ASA --Andrea

## 2018-12-19 NOTE — ED CDU PROVIDER INITIAL DAY NOTE - DETAILS
- frequent re-eval  - vitals q 4hrs  - tele  - neurochecks q 4hrs  - MRI/MRA head and neck  - neuro following  - case discussed with attending Dr. Andrea

## 2018-12-19 NOTE — H&P ADULT - ATTENDING COMMENTS
78-year-old left handed white lady first evaluated at [facility] on 12/19/18 with right-sided numbness. She has a history of atrial fibrillation and had been treated by her cardiologist, Dr. George, with Eliquis (not covered by her insurance, but she was given ongoing free samples). About one year prior to admission, she felt well, and decided that she did not need the Eliquis any longer. She stopped the Eliquis on her own and subsequently began taking baby aspirin. On 12/17/18, she developed tingling affecting the right side of her face and right hand, and the symptoms persisted. When she walked to the MRI at [facility], she found herself staggering slightly to the right. ROS otherwise negative. Exam. Alert and attentive; sensory exam intact including on right side of face and hand; gait not tested; remainder of neurologic exam was nonfocal. MRI brain (12/19/18) to my eye showed punctate foci of acute infarction in the left PCA distribution: Paramedian thalamus, lateral thalamus, medial temporal lobe. MRA neck and head (12/19/18) to my eye showed an abrupt occlusion of the proximal P2 segment of the left PCA. There might have been minimal narrowing of the right M1 segment. Impression. She has a history of atrial fibrillation, but stopped her Eliquis on her own over a year prior to admission because she felt well and that the medication was unnecessary. On 12/17/18, she developed tingling affecting the right side of her face and right hand, which persisted. 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 MRA brain (12/19/18) showed abrupt occlusion of the P2 segment of the left PCA. 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 Plan. Elective TTE as inpatient or outpatient; continue aspirin for now; check which DOAC is covered by her insurance, and then start that DOAC (and stop aspirin); most likely can be discharged on 12/20/18.

## 2018-12-19 NOTE — ED CDU PROVIDER INITIAL DAY NOTE - MEDICAL DECISION MAKING DETAILS
: Pt with intermittent slurred speech and facial weakness over 24 hors no fall no sz or diplopia or other visual changes no n/v no headache Alert no distress YUDITH EOM wnl heart regular s1s2 no carotid bruit sensory and motor intact no dysphagia CT head neg for any acute changes will do observation in CDU and obtain an MRI neuro check ASA --Andrea

## 2018-12-19 NOTE — H&P ADULT - HISTORY OF PRESENT ILLNESS
78 Y Female with PMH of HTN, migraine -only aura,  was transfer from [facility] for CT head. As per patient, she had 3 episodes where she developed right sided facial numbness/slurred speech. Each episodes lasted for 5-10 minutes. She also had her usual scintillating scotoma for last 2 days. She denied any headache. Each episodes resolved spontaneously and denied prior similar episodes. Her NIHSS was 0 and MRS was 0. tPa was not given as she had no neurological deficits.

## 2018-12-19 NOTE — ED CDU PROVIDER DISPOSITION NOTE - CLINICAL COURSE
78 y.o. female with PMH Afib, HTN, hyperthyroid, c/o 3 episodes of slurred speech and right sided facial droop today.  states she has had visual floaters all day today. First episode of slurred speech and R facial droop was at 11am and spontaneously resolved.  Last episode was 5 hours prior to ED arrival which had also spontaneously resolved. Symptoms associated with  R face and chest numbness, R hand tingling, and leaning to R side when walking. Pt currently feeling well. Nothing made her symptoms worse and they got better on their own. Denies fever, chills, sweats, N/V/D, weakness, CP, abd pain, back pain, problems going to the bathroom.   In ED, labs unremarkable, CTH negative, CXR negative. pt was code stroke, neuro saw pt, think it is likely a migraine, but considering risk factors will r/o stroke. pt sent to CDU for tele, neuro checks, MRI/MRA.   In CDU, MRI _________ and MRI ___________. 78 y.o. female with PMH Afib, HTN, hyperthyroid, c/o 3 episodes of slurred speech and right sided facial droop today.  states she has had visual floaters all day today. First episode of slurred speech and R facial droop was at 11am and spontaneously resolved.  Last episode was 5 hours prior to ED arrival which had also spontaneously resolved. Symptoms associated with  R face and chest numbness, R hand tingling, and leaning to R side when walking. Pt currently feeling well. Nothing made her symptoms worse and they got better on their own. Denies fever, chills, sweats, N/V/D, weakness, CP, abd pain, back pain, problems going to the bathroom.   In ED, labs unremarkable, CTH negative, CXR negative. pt was code stroke, neuro saw pt, think it is likely a migraine, but considering risk factors will r/o stroke. pt sent to CDU for tele, neuro checks, MRI/MRA.   In CDU, patient without return of symptoms. No events on tele. MRI revealing L thalamic infarct. Pt evaluated by neurologist, Dr. Thurston. Attempted to have patient follow up outpatient with cardiologist and neurologist, however unable to obtain rapid cards f/u. Pt to be admitted for TTE, initiation of AC therapy, and further monitoring.

## 2018-12-19 NOTE — ED CDU PROVIDER DISPOSITION NOTE - PLAN OF CARE
1. Continue your home medications as directed, including aspirin 81mg daily.  2. Drink plenty of fluids to stay hydrated.  3. You will need to follow up with your PMD and neurologist or our neurology clinic at [phone] in 2-3 days. A copy of your results were given to you to bring to your appt.   4. Return to ER for headache, confusion, behavior/speech changes, numbness/tingling/weakness in your arms/legs, or any other concerns.

## 2018-12-19 NOTE — H&P ADULT - NSHPLABSRESULTS_GEN_ALL_CORE
< from: CT Brain Stroke Protocol (12.18.18 @ 20:29) >    IMPRESSION:   No intracranial hemorrhage, mass effect, or midline shift. Chronic white   matter microvascular ischemic changes.    < end of copied text >    < from: MR Head No Cont (12.19.18 @ 10:31) >    IMPRESSION:    MRI BRAIN:    Small acute infarcts involving the left thalamus. Possible small acute   infarcts along the mesial aspect of the left temporal horn. No evidence   for hemorrhagic transformation.    Moderate white matter microvascular ischemic disease.    No abnormal parenchymal or leptomeningeal enhancement.    MRA BRAIN:    Abrupt cut off of the proximal left P2 segment of the left PCA,   consistent with vessel occlusion. The age of this occlusion is   indeterminate.    Apparent mild narrowing of the right M1 segment may be artifactual in   nature. Normal flow-related signal within the more distal right MCA.    No vascular aneurysm    MRA NECK:    No flow-limiting stenosis. Carotid bifurcations and origins of the common   carotid and vertebral arteries unremarkable.    < end of copied text >

## 2018-12-20 ENCOUNTER — TRANSCRIPTION ENCOUNTER (OUTPATIENT)
Age: 78
End: 2018-12-20

## 2018-12-20 VITALS
DIASTOLIC BLOOD PRESSURE: 80 MMHG | OXYGEN SATURATION: 98 % | TEMPERATURE: 98 F | HEART RATE: 68 BPM | SYSTOLIC BLOOD PRESSURE: 144 MMHG | RESPIRATION RATE: 18 BRPM

## 2018-12-20 DIAGNOSIS — I63.9 CEREBRAL INFARCTION, UNSPECIFIED: ICD-10-CM

## 2018-12-20 DIAGNOSIS — I10 ESSENTIAL (PRIMARY) HYPERTENSION: ICD-10-CM

## 2018-12-20 DIAGNOSIS — I48.91 UNSPECIFIED ATRIAL FIBRILLATION: ICD-10-CM

## 2018-12-20 LAB
ANION GAP SERPL CALC-SCNC: 12 MMOL/L — SIGNIFICANT CHANGE UP (ref 5–17)
BUN SERPL-MCNC: 20 MG/DL — SIGNIFICANT CHANGE UP (ref 7–23)
CALCIUM SERPL-MCNC: 9 MG/DL — SIGNIFICANT CHANGE UP (ref 8.4–10.5)
CHLORIDE SERPL-SCNC: 103 MMOL/L — SIGNIFICANT CHANGE UP (ref 96–108)
CHOLEST SERPL-MCNC: 185 MG/DL — SIGNIFICANT CHANGE UP (ref 10–199)
CO2 SERPL-SCNC: 25 MMOL/L — SIGNIFICANT CHANGE UP (ref 22–31)
CREAT SERPL-MCNC: 0.82 MG/DL — SIGNIFICANT CHANGE UP (ref 0.5–1.3)
GLUCOSE SERPL-MCNC: 122 MG/DL — HIGH (ref 70–99)
HDLC SERPL-MCNC: 64 MG/DL — SIGNIFICANT CHANGE UP
LIPID PNL WITH DIRECT LDL SERPL: 103 MG/DL — SIGNIFICANT CHANGE UP
POTASSIUM SERPL-MCNC: 3.5 MMOL/L — SIGNIFICANT CHANGE UP (ref 3.5–5.3)
POTASSIUM SERPL-SCNC: 3.5 MMOL/L — SIGNIFICANT CHANGE UP (ref 3.5–5.3)
SODIUM SERPL-SCNC: 140 MMOL/L — SIGNIFICANT CHANGE UP (ref 135–145)
TOTAL CHOLESTEROL/HDL RATIO MEASUREMENT: 2.9 RATIO — LOW (ref 3.3–7.1)
TRIGL SERPL-MCNC: 89 MG/DL — SIGNIFICANT CHANGE UP (ref 10–149)

## 2018-12-20 PROCEDURE — 82962 GLUCOSE BLOOD TEST: CPT

## 2018-12-20 PROCEDURE — 84484 ASSAY OF TROPONIN QUANT: CPT

## 2018-12-20 PROCEDURE — 87086 URINE CULTURE/COLONY COUNT: CPT

## 2018-12-20 PROCEDURE — 70544 MR ANGIOGRAPHY HEAD W/O DYE: CPT

## 2018-12-20 PROCEDURE — 70549 MR ANGIOGRAPH NECK W/O&W/DYE: CPT

## 2018-12-20 PROCEDURE — 80048 BASIC METABOLIC PNL TOTAL CA: CPT

## 2018-12-20 PROCEDURE — G0378: CPT

## 2018-12-20 PROCEDURE — 93005 ELECTROCARDIOGRAM TRACING: CPT

## 2018-12-20 PROCEDURE — 93306 TTE W/DOPPLER COMPLETE: CPT | Mod: 26

## 2018-12-20 PROCEDURE — 80053 COMPREHEN METABOLIC PANEL: CPT

## 2018-12-20 PROCEDURE — 85610 PROTHROMBIN TIME: CPT

## 2018-12-20 PROCEDURE — 83036 HEMOGLOBIN GLYCOSYLATED A1C: CPT

## 2018-12-20 PROCEDURE — 81001 URINALYSIS AUTO W/SCOPE: CPT

## 2018-12-20 PROCEDURE — 85027 COMPLETE CBC AUTOMATED: CPT

## 2018-12-20 PROCEDURE — 99233 SBSQ HOSP IP/OBS HIGH 50: CPT

## 2018-12-20 PROCEDURE — 70551 MRI BRAIN STEM W/O DYE: CPT

## 2018-12-20 PROCEDURE — 93306 TTE W/DOPPLER COMPLETE: CPT

## 2018-12-20 PROCEDURE — 99285 EMERGENCY DEPT VISIT HI MDM: CPT | Mod: 25

## 2018-12-20 PROCEDURE — 85730 THROMBOPLASTIN TIME PARTIAL: CPT

## 2018-12-20 PROCEDURE — 70450 CT HEAD/BRAIN W/O DYE: CPT

## 2018-12-20 PROCEDURE — 80061 LIPID PANEL: CPT

## 2018-12-20 RX ORDER — ASPIRIN/CALCIUM CARB/MAGNESIUM 324 MG
1 TABLET ORAL
Qty: 0 | Refills: 0 | COMMUNITY

## 2018-12-20 RX ORDER — DABIGATRAN ETEXILATE MESYLATE 150 MG/1
1 CAPSULE ORAL
Qty: 0 | Refills: 0 | COMMUNITY
Start: 2018-12-20

## 2018-12-20 RX ORDER — DABIGATRAN ETEXILATE MESYLATE 150 MG/1
150 CAPSULE ORAL EVERY 12 HOURS
Qty: 0 | Refills: 0 | Status: DISCONTINUED | OUTPATIENT
Start: 2018-12-20 | End: 2018-12-20

## 2018-12-20 RX ORDER — DABIGATRAN ETEXILATE MESYLATE 150 MG/1
1 CAPSULE ORAL
Qty: 60 | Refills: 0 | OUTPATIENT
Start: 2018-12-20 | End: 2019-01-18

## 2018-12-20 RX ORDER — LOSARTAN POTASSIUM 100 MG/1
1 TABLET, FILM COATED ORAL
Qty: 0 | Refills: 0 | COMMUNITY

## 2018-12-20 RX ORDER — LOSARTAN POTASSIUM 100 MG/1
1 TABLET, FILM COATED ORAL
Qty: 30 | Refills: 0 | OUTPATIENT
Start: 2018-12-20 | End: 2019-01-18

## 2018-12-20 RX ADMIN — AMLODIPINE BESYLATE 5 MILLIGRAM(S): 2.5 TABLET ORAL at 05:30

## 2018-12-20 RX ADMIN — Medication 25 MILLIGRAM(S): at 05:30

## 2018-12-20 RX ADMIN — SODIUM CHLORIDE 3 MILLILITER(S): 9 INJECTION INTRAMUSCULAR; INTRAVENOUS; SUBCUTANEOUS at 05:30

## 2018-12-20 RX ADMIN — Medication 81 MILLIGRAM(S): at 12:29

## 2018-12-20 NOTE — PROGRESS NOTE ADULT - SUBJECTIVE AND OBJECTIVE BOX
THE PATIENT WAS SEEN AND EXAMINED BY ME WITH THE HOUSESTAFF AND STROKE TEAM DURING MORNING ROUNDS.   HPI:    78-year-old left handed white lady first evaluated at [facility] on 12/19/18 with right-sided numbness. She has a history of atrial fibrillation and had been treated by her cardiologist, Dr. George, with Eliquis (not covered by her insurance, but she was given ongoing free samples). About one year prior to admission, she felt well, and decided that she did not need the Eliquis any longer. She stopped the Eliquis on her own and subsequently began taking baby aspirin. On 12/17/18, she developed tingling affecting the right side of her face and right hand, and the symptoms persisted. When she walked to the MRI at [facility], she found herself staggering slightly to the right.     ROS otherwise negative.    SUBJECTIVE: No events overnight.  No new neurologic complaints.      amLODIPine   Tablet 5 milliGRAM(s) Oral daily  aspirin enteric coated 81 milliGRAM(s) Oral daily  enoxaparin Injectable 40 milliGRAM(s) SubCutaneous every 24 hours  methimazole 5 milliGRAM(s) Oral daily  metoprolol succinate ER 25 milliGRAM(s) Oral daily  sodium chloride 0.9% lock flush 3 milliLiter(s) IV Push every 8 hours    PHYSICAL EXAM:   Vital Signs Last 24 Hrs  T(C): 36.9 (20 Dec 2018 05:23), Max: 36.9 (20 Dec 2018 05:23)  T(F): 98.4 (20 Dec 2018 05:23), Max: 98.4 (20 Dec 2018 05:23)  HR: 62 (20 Dec 2018 05:23) (62 - 80)  BP: 134/77 (20 Dec 2018 05:23) (121/76 - 163/89)  RR: 18 (20 Dec 2018 05:23) (16 - 18)  SpO2: 95% (20 Dec 2018 05:23) (93% - 98%)    General: No acute distress  HEENT: EOM intact, visual fields full  Abdomen: Soft, nontender, nondistended   Extremities: No edema    NEUROLOGICAL EXAM:  Mental status: Awake, alert, oriented x3, no neglect, normal memory   Cranial Nerves: No facial asymmetry, no nystagmus, no dysarthria,  tongue midline  Motor exam: Normal tone, no drift, 5/5 RUE, 5/5 RLE, 5/5 LUE, 5/5 LLE, normal fine finger movements.  Sensation: Intact to light touch   Coordination/ Gait: No dysmetria, gait    LABS:                        15.2   5.9   )-----------( 181      ( 18 Dec 2018 20:27 )             44.1    12-18    142  |  104  |  16  ----------------------------<  127<H>  4.1   |  26  |  0.66    Ca    9.3      18 Dec 2018 20:27    TPro  7.8  /  Alb  4.5  /  TBili  0.4  /  DBili  x   /  AST  25  /  ALT  17  /  AlkPhos  118  12-18  PT/INR - ( 18 Dec 2018 20:27 )   PT: 10.8 sec;   INR: 0.94 ratio      PTT - ( 18 Dec 2018 20:27 )  PTT:27.8 sec  Hemoglobin A1C, Whole Blood: 5.7 % (12-19 @ 08:07)    IMAGING: Reviewed by me.     MRI BRAIN w/o Cont (12.19.18)  Small acute infarcts involving the left thalamus. Possible small acute   infarcts along the mesial aspect of the left temporal horn. No evidence   for hemorrhagic transformation.  Moderate white matter microvascular ischemic disease.  No abnormal parenchymal or leptomeningeal enhancement.    MRA BRAIN w/o Cont (12.19.18)  Abrupt cut off of the proximal left P2 segment of the left PCA,   consistent with vessel occlusion. The age of this occlusion is   indeterminate.  Apparent mild narrowing of the right M1 segment may be artifactual in   nature. Normal flow-related signal within the more distal right MCA.  No vascular aneurysm    MRA NECK w/ IV Cont (12.19.18)  No flow-limiting stenosis. Carotid bifurcations and origins of the common   carotid and vertebral arteries unremarkable.    CT Head No Cont (12.18.18)  No intracranial hemorrhage, mass effect, or midline shift. Chronic white   matter microvascular ischemic changes. THE PATIENT WAS SEEN AND EXAMINED BY ME WITH THE HOUSESTAFF AND STROKE TEAM DURING MORNING ROUNDS.   HPI:    78-year-old left handed white lady first evaluated at [facility] on 12/19/18 with right-sided numbness. She has a history of atrial fibrillation and had been treated by her cardiologist, Dr. George, with Eliquis (not covered by her insurance, but she was given ongoing free samples). About one year prior to admission, she felt well, and decided that she did not need the Eliquis any longer. She stopped the Eliquis on her own and subsequently began taking baby aspirin. On 12/17/18, she developed tingling affecting the right side of her face and right hand, and the symptoms persisted. When she walked to the MRI at [facility], she found herself staggering slightly to the right.     SUBJECTIVE: No events overnight.  Reports numbness on face and hand.     amLODIPine   Tablet 5 milliGRAM(s) Oral daily  aspirin enteric coated 81 milliGRAM(s) Oral daily  enoxaparin Injectable 40 milliGRAM(s) SubCutaneous every 24 hours  methimazole 5 milliGRAM(s) Oral daily  metoprolol succinate ER 25 milliGRAM(s) Oral daily  sodium chloride 0.9% lock flush 3 milliLiter(s) IV Push every 8 hours    PHYSICAL EXAM:   Vital Signs Last 24 Hrs  T(C): 36.9 (20 Dec 2018 05:23), Max: 36.9 (20 Dec 2018 05:23)  T(F): 98.4 (20 Dec 2018 05:23), Max: 98.4 (20 Dec 2018 05:23)  HR: 62 (20 Dec 2018 05:23) (62 - 80)  BP: 134/77 (20 Dec 2018 05:23) (121/76 - 163/89)  RR: 18 (20 Dec 2018 05:23) (16 - 18)  SpO2: 95% (20 Dec 2018 05:23) (93% - 98%)    General: No acute distress  HEENT: EOM intact, visual fields full  Abdomen: Soft, nontender, nondistended   Extremities: No edema    NEUROLOGICAL EXAM:  Mental status: Awake, alert, oriented x3, no neglect, normal memory  Cranial Nerves: No facial asymmetry, no nystagmus, mild dysarthria, tongue midline  Motor exam: Normal tone, no drift, 5/5 RUE, 5/5 RLE, 5/5 LUE, 5/5 LLE  Sensation: mild hyperesthesia to temperate on right hand  Coordination/ Gait: No dysmetria, gait deferred as patient on stretcher in echo lab    LABS:                        15.2   5.9   )-----------( 181      ( 18 Dec 2018 20:27 )             44.1    12-18    142  |  104  |  16  ----------------------------<  127<H>  4.1   |  26  |  0.66    Ca    9.3      18 Dec 2018 20:27    TPro  7.8  /  Alb  4.5  /  TBili  0.4  /  DBili  x   /  AST  25  /  ALT  17  /  AlkPhos  118  12-18  PT/INR - ( 18 Dec 2018 20:27 )   PT: 10.8 sec;   INR: 0.94 ratio      PTT - ( 18 Dec 2018 20:27 )  PTT:27.8 sec  Hemoglobin A1C, Whole Blood: 5.7 % (12-19 @ 08:07)    IMAGING: Reviewed by me.     MRI BRAIN w/o Cont (12.19.18)  Small acute infarcts involving the left thalamus. Possible small acute   infarcts along the mesial aspect of the left temporal horn. No evidence   for hemorrhagic transformation.  Moderate white matter microvascular ischemic disease.  No abnormal parenchymal or leptomeningeal enhancement.    MRA BRAIN w/o Cont (12.19.18)  Abrupt cut off of the proximal left P2 segment of the left PCA,   consistent with vessel occlusion. The age of this occlusion is   indeterminate.  Apparent mild narrowing of the right M1 segment may be artifactual in   nature. Normal flow-related signal within the more distal right MCA.  No vascular aneurysm    MRA NECK w/ IV Cont (12.19.18)  No flow-limiting stenosis. Carotid bifurcations and origins of the common   carotid and vertebral arteries unremarkable.    CT Head No Cont (12.18.18)  No intracranial hemorrhage, mass effect, or midline shift. Chronic white   matter microvascular ischemic changes. THE PATIENT WAS SEEN AND EXAMINED BY ME WITH THE HOUSESTAFF AND STROKE TEAM DURING MORNING ROUNDS.   HPI:    78-year-old left handed white lady first evaluated at [facility] on 12/19/18 with right-sided numbness. She has a history of atrial fibrillation and had been treated by her cardiologist, Dr. George, with Eliquis (not covered by her insurance, but she was given ongoing free samples). About one year prior to admission, she felt well, and decided that she did not need the Eliquis any longer. She stopped the Eliquis on her own and subsequently began taking baby aspirin. On 12/17/18, she developed tingling affecting the right side of her face and right hand, and the symptoms persisted. When she walked to the MRI at [facility], she found herself staggering slightly to the right.     SUBJECTIVE: No events overnight.  Reports numbness on face and hand.     amLODIPine   Tablet 5 milliGRAM(s) Oral daily  aspirin enteric coated 81 milliGRAM(s) Oral daily  enoxaparin Injectable 40 milliGRAM(s) SubCutaneous every 24 hours  methimazole 5 milliGRAM(s) Oral daily  metoprolol succinate ER 25 milliGRAM(s) Oral daily  sodium chloride 0.9% lock flush 3 milliLiter(s) IV Push every 8 hours    PHYSICAL EXAM:   Vital Signs Last 24 Hrs  T(C): 36.9 (20 Dec 2018 05:23), Max: 36.9 (20 Dec 2018 05:23)  T(F): 98.4 (20 Dec 2018 05:23), Max: 98.4 (20 Dec 2018 05:23)  HR: 62 (20 Dec 2018 05:23) (62 - 80)  BP: 134/77 (20 Dec 2018 05:23) (121/76 - 163/89)  RR: 18 (20 Dec 2018 05:23) (16 - 18)  SpO2: 95% (20 Dec 2018 05:23) (93% - 98%)    General: No acute distress  HEENT: EOM intact, visual fields full  Abdomen: Soft, nontender, nondistended   Extremities: No edema    NEUROLOGICAL EXAM:  Mental status: Awake, alert, oriented x3, no neglect, normal memory  Cranial Nerves: No facial asymmetry, no nystagmus, mild dysarthria, tongue midline  Motor exam: Normal tone, no drift, 5/5 RUE, 5/5 RLE, 5/5 LUE, 5/5 LLE  Sensation: mild hyperesthesia to temperature on right hand  Coordination/ Gait: No dysmetria, gait deferred as patient on stretcher in echo lab    LABS:                        15.2   5.9   )-----------( 181      ( 18 Dec 2018 20:27 )             44.1    12-18    142  |  104  |  16  ----------------------------<  127<H>  4.1   |  26  |  0.66    Ca    9.3      18 Dec 2018 20:27    TPro  7.8  /  Alb  4.5  /  TBili  0.4  /  DBili  x   /  AST  25  /  ALT  17  /  AlkPhos  118  12-18  PT/INR - ( 18 Dec 2018 20:27 )   PT: 10.8 sec;   INR: 0.94 ratio      PTT - ( 18 Dec 2018 20:27 )  PTT:27.8 sec  Hemoglobin A1C, Whole Blood: 5.7 % (12-19 @ 08:07)    IMAGING: Reviewed by me.     MRI BRAIN w/o Cont (12.19.18)  Small acute infarcts involving the left thalamus. Possible small acute   infarcts along the mesial aspect of the left temporal horn. No evidence   for hemorrhagic transformation.  Moderate white matter microvascular ischemic disease.  No abnormal parenchymal or leptomeningeal enhancement.    MRA BRAIN w/o Cont (12.19.18)  Abrupt cut off of the proximal left P2 segment of the left PCA,   consistent with vessel occlusion. The age of this occlusion is   indeterminate.  Apparent mild narrowing of the right M1 segment may be artifactual in   nature. Normal flow-related signal within the more distal right MCA.  No vascular aneurysm    MRA NECK w/ IV Cont (12.19.18)  No flow-limiting stenosis. Carotid bifurcations and origins of the common   carotid and vertebral arteries unremarkable.    CT Head No Cont (12.18.18)  No intracranial hemorrhage, mass effect, or midline shift. Chronic white   matter microvascular ischemic changes.

## 2018-12-20 NOTE — DISCHARGE NOTE ADULT - NS AS DC STROKE ED MATERIALS
Patient requested pain medication for her buttocks, patient stated that she was unable to sleep I offered patient sleep medication the patient refused to take medication. The patient stated that she was frustrated due to her hurting, I offered patient anxiety medication the patient refused the patient is currently in her room moaning and groaning will continue to monitor. Stroke Education Booklet/Prescribed Medications/Stroke Warning Signs and Symptoms/Call 911 for Stroke/Need for Followup After Discharge/Risk Factors for Stroke

## 2018-12-20 NOTE — CHART NOTE - NSCHARTNOTEFT_GEN_A_CORE
1) Indication for loop recorder interrogation: R/O arrythmia   2) Presenting rhythm: Sinus Rhythm  3) Stored data revealed no events for review.  4) AT/AF burden: 0%  5) Changes made: none  6) Battery Status: Good

## 2018-12-20 NOTE — DISCHARGE NOTE ADULT - PATIENT PORTAL LINK FT
You can access the [email or web address], offered by [facility], by registering with the following website: [email or web address]

## 2018-12-20 NOTE — PHARMACOTHERAPY INTERVENTION NOTE - COMMENTS
Discharge medication for above medications, side effects, monitoring, compliance. Educated patient and daughter

## 2018-12-20 NOTE — DISCHARGE NOTE ADULT - CARE PROVIDER_API CALL
Mark Anthony Thurston (DO), Neurology; Vascular Neurology  [address]  [place] [address]  Phone: [phone]  Fax: [phone] Mark Anthony Thurston (DO), Neurology; Vascular Neurology  [address]  [place] [address]  Phone: [phone]  Fax: [phone]    Mario Da Silva (DO), Cardiology; Internal Medicine  [address]  [address]  [place] [address]  Phone: [phone]  Fax: [phone]

## 2018-12-20 NOTE — DISCHARGE NOTE ADULT - MEDICATION SUMMARY - MEDICATIONS TO TAKE
I will START or STAY ON the medications listed below when I get home from the hospital:    Cozaar 50 mg oral tablet  -- 1 tab(s) by mouth once a day  -- Indication: For HTN (hypertension)    Pradaxa 150 mg oral capsule  -- 1 cap(s) by mouth every 12 hours  -- Indication: For CVA (cerebral vascular accident)    methIMAzole 5 mg oral tablet  -- 1 tab(s) by mouth once a day  -- Indication: For Home med    metoprolol succinate 25 mg oral tablet, extended release  -- 1 tab(s) by mouth once a day  -- Indication: For HTN (hypertension)    amLODIPine 5 mg oral tablet  -- 1 tab(s) by mouth once a day  -- Indication: For HTN (hypertension)

## 2018-12-20 NOTE — PROGRESS NOTE ADULT - ASSESSMENT
78-year-old left handed white lady first evaluated at [facility] on 12/19/18 with right-sided numbness. When she walked to the MRI at [facility], she found herself staggering slightly to the right.     Impression. She has a history of atrial fibrillation, but stopped her Eliquis on her own over a year prior to admission because she felt well and that the medication was unnecessary. On 12/17/18, she developed tingling affecting the right side of her face and right hand, which persisted. 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 MRA brain (12/19/18) showed abrupt occlusion of the P2 segment of the left PCA. 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    NEURO: Neurologically? Continue close monitoring for neurologic deterioration, allow for gradual normotension, Admission -, MRI Brain w/o, MRA Head w/o and Neck w/contrast noted above. Physical therapy/OT deferred as patient back to neurological baseline.    ANTITHROMBOTIC THERAPY: ASA for secondary stroke prevention with plan to start AC once TTE performed     PULMONARY: CXR clear, protecting airway, saturating well     CARDIOVASCULAR: check TTE to look for valvular abnormalities, continue cardiac monitoring                SBP goal: Gradual normotension    GASTROINTESTINAL:  dysphagia screen passed- tolerating regular diet      Diet: DASH/TLC diet    RENAL: BUN/Cr previously within normal limits, good urine output      Na Goal: Greater than 135     Arora: no    HEMATOLOGY: no signs of bleeding noted     DVT ppx: LMWH [x]     ID: afebrile, no sign of infection    OTHER:     DISPOSITION: Rehab or home depending on PT eval once stable and workup is complete    CORE MEASURES:        Admission NIHSS: 0     TPA: [] YES [x] NO      LDL/HDL: 104/69     Depression Screen: p     Statin Therapy:      Dysphagia Screen: [x] PASS [] FAIL     Smoking [] YES [x] NO      Afib [x] YES [] NO     Stroke Education [x] YES [] NO 78-year-old left handed white lady first evaluated at [facility] on 12/19/18 with right-sided numbness. When she walked to the MRI at [facility], she found herself staggering slightly to the right.     Impression. She has a history of atrial fibrillation, but stopped her Eliquis on her own over a year prior to admission because she felt well and that the medication was unnecessary. On 12/17/18, she developed tingling affecting the right side of her face and right hand, which persisted. 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 MRA brain (12/19/18) showed abrupt occlusion of the P2 segment of the left PCA. 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    NEURO: Neurologically without acute change, Continue close monitoring for neurologic deterioration, allow for gradual normotension, MRI Brain w/o, MRA Head w/o and Neck w/contrast noted above. Physical therapy/OT deferred as patient back to neurological baseline.    ANTITHROMBOTIC THERAPY: Dabigatran for secondary stroke prevention in setting of atrial fibrillation     PULMONARY: CXR clear, protecting airway, saturating well     CARDIOVASCULAR: check TTE to look for valvular abnormalities, continue cardiac monitoring                SBP goal: Gradual normotension    GASTROINTESTINAL:  dysphagia screen passed- tolerating regular diet      Diet: DASH/TLC diet    RENAL: BUN/Cr within normal limits, good urine output      Na Goal: Greater than 135     Arora: no    HEMATOLOGY: no signs of bleeding noted     DVT ppx: LMWH [x]     ID: afebrile, no sign of infection    OTHER: Plan endorsed to patient at bedside, all questions and concerns addressed.    DISPOSITION: Home with outpatient speech and therapy evaluations.     CORE MEASURES:        Admission NIHSS: 0     TPA: [] YES [x] NO      LDL/HDL: 104/69     Depression Screen: 0     Statin Therapy:      Dysphagia Screen: [x] PASS [] FAIL     Smoking [] YES [x] NO      Afib [x] YES [] NO     Stroke Education [x] YES [] NO 78-year-old left handed white lady first evaluated at [facility] on 12/19/18 with right-sided numbness. When she walked to the MRI at [facility], she found herself staggering slightly to the right.     Impression. She has a history of atrial fibrillation, but stopped her Eliquis on her own over a year prior to admission because she felt well and that the medication was unnecessary. On 12/17/18, she developed tingling affecting the right side of her face and right hand, which persisted. 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 MRA brain (12/19/18) showed abrupt occlusion of the P2 segment of the left PCA. 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    NEURO: Neurologically without acute change, allow for gradual normotension, - no need for statin as etiology of infarct non-atherosclerotic, will need to follow up with PCP for cardiovascular risk assessment, MRI Brain w/o, MRA Head w/o and Neck w/contrast noted above. Physical therapy/OT deferred as patient back to neurological baseline.    ANTITHROMBOTIC THERAPY: Dabigatran for secondary stroke prevention in setting of atrial fibrillation     PULMONARY: CXR clear, protecting airway, saturating well     CARDIOVASCULAR: check TTE to look for valvular abnormalities, continue cardiac monitoring                SBP goal: Gradual normotension    GASTROINTESTINAL: dysphagia screen passed- tolerating regular diet      Diet: DASH/TLC diet    RENAL: BUN/Cr within normal limits, good urine output      Na Goal: Greater than 135     Arora: no    HEMATOLOGY: no signs of bleeding noted     DVT ppx: LMWH [x]     ID: afebrile, no sign of infection    OTHER: Plan endorsed to patient at bedside, all questions and concerns addressed.    DISPOSITION: Home with outpatient speech and therapy evaluations.     CORE MEASURES:        Admission NIHSS: 0     TPA: [] YES [x] NO      LDL/HDL: 104/69     Depression Screen: 0     Statin Therapy: not indicated.      Dysphagia Screen: [x] PASS [] FAIL     Smoking [] YES [x] NO      Afib [x] YES [] NO     Stroke Education [x] YES [] NO 78-year-old left handed white lady first evaluated at [facility] on 12/19/18 with right-sided numbness. When she walked to the MRI at [facility], she found herself staggering slightly to the right.     Impression. She has a history of atrial fibrillation, but stopped her Eliquis on her own over a year prior to admission because she felt well and that the medication was unnecessary. On 12/17/18, she developed tingling affecting the right side of her face and right hand, which persisted. 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 MRA brain (12/19/18) showed abrupt occlusion of the P2 segment of the left PCA. 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    NEURO: Neurologically without acute change, allow for gradual normotension, - no need for statin as etiology of infarct non-atherosclerotic, will need to follow up with PCP for cardiovascular risk assessment and possible treatment with a statin, MRI Brain w/o, MRA Head w/o and Neck w/contrast noted above. Physical therapy/OT deferred as patient back to neurological baseline.    ANTITHROMBOTIC THERAPY: Dabigatran covered by her insurance)  for secondary stroke prevention in setting of atrial fibrillation     PULMONARY: CXR clear, protecting airway, saturating well     CARDIOVASCULAR: check TTE to look for valvular abnormalities, continue cardiac monitoring                SBP goal: Gradual normotension    GASTROINTESTINAL: dysphagia screen passed- tolerating regular diet      Diet: DASH/TLC diet    RENAL: BUN/Cr within normal limits, good urine output      Na Goal: Greater than 135     Arora: no    HEMATOLOGY: no signs of bleeding noted     DVT ppx: LMWH [x]     ID: afebrile, no sign of infection    OTHER: Plan endorsed to patient at bedside, all questions and concerns addressed.    DISPOSITION: Home with outpatient speech and therapy evaluations.     CORE MEASURES:        Admission NIHSS: 0     TPA: [] YES [x] NO      LDL/HDL: 104/69     Depression Screen: 0     Statin Therapy: not indicated.      Dysphagia Screen: [x] PASS [] FAIL     Smoking [] YES [x] NO      Afib [x] YES [] NO     Stroke Education [x] YES [] NO

## 2018-12-20 NOTE — CONSULT NOTE ADULT - SUBJECTIVE AND OBJECTIVE BOX
CHIEF COMPLAINT:  CVA     HISTORY OF PRESENT ILLNESS:  78 Y Female with PMH of HTN, migraine -only aura,  was transfer from [facility] for CT head. As per patient, she had 3 episodes where she developed right sided facial numbness/slurred speech. Each episodes lasted for 5-10 minutes. She also had her usual scintillating scotoma for last 2 days. She denied any headache. Each episodes resolved spontaneously and denied prior similar episodes. Her NIHSS was 0 and MRS was 0. tPa was not given as she had no neurological deficits.  Has history of Afib but has not been taking AC for a year and half. An ILR was implanted 3 months ago to assess for AF burden.       PAST MEDICAL & SURGICAL HISTORY:  Overactive thyroid gland  Afib  HTN (hypertension)  History of tonsillectomy  S/P appendectomy  H/O colonoscopy: 5 polyups removed          MEDICATIONS:  amLODIPine   Tablet 5 milliGRAM(s) Oral daily  aspirin enteric coated 81 milliGRAM(s) Oral daily  enoxaparin Injectable 40 milliGRAM(s) SubCutaneous every 24 hours  metoprolol succinate ER 25 milliGRAM(s) Oral daily            methimazole 5 milliGRAM(s) Oral daily    sodium chloride 0.9% lock flush 3 milliLiter(s) IV Push every 8 hours      FAMILY HISTORY:  No pertinent family history in first degree relatives      SOCIAL HISTORY:    [ ] Non-smoker  [ ] Smoker  [ ] Alcohol    Allergies    No Known Allergies    Intolerances    	    REVIEW OF SYSTEMS:  CONSTITUTIONAL: No fever, weight loss, + fatigue  EYES: No eye pain, visual disturbances, or discharge  ENMT:  No difficulty hearing, tinnitus, vertigo; No sinus or throat pain  NECK: No pain or stiffness  RESPIRATORY: No cough, wheezing, chills or hemoptysis; No Shortness of Breath  CARDIOVASCULAR: No chest pain, palpitations, passing out, dizziness, or leg swelling  GASTROINTESTINAL: No abdominal or epigastric pain. No nausea, vomiting, or hematemesis; No diarrhea or constipation. No melena or hematochezia.  GENITOURINARY: No dysuria, frequency, hematuria, or incontinence  NEUROLOGICAL: No headaches, memory loss,+loss of strength, numbness, or tremors  SKIN: No itching, burning, rashes, or lesions   LYMPH Nodes: No enlarged glands  ENDOCRINE: No heat or cold intolerance; No hair loss  MUSCULOSKELETAL: No joint pain or swelling; No muscle, back, or extremity pain  PSYCHIATRIC: No depression, anxiety, mood swings, or difficulty sleeping  HEME/LYMPH: No easy bruising, or bleeding gums  ALLERY AND IMMUNOLOGIC: No hives or eczema	    [ ] All others negative	  [ ] Unable to obtain    PHYSICAL EXAM:  T(C): 36.6 (12-20-18 @ 09:27), Max: 36.9 (12-20-18 @ 05:23)  HR: 80 (12-20-18 @ 09:27) (62 - 80)  BP: 155/84 (12-20-18 @ 09:27) (134/77 - 163/89)  RR: 18 (12-20-18 @ 09:27) (17 - 18)  SpO2: 95% (12-20-18 @ 09:27) (93% - 96%)  Wt(kg): --  I&O's Summary      Appearance: Normal	  HEENT:   Normal oral mucosa, PERRL, EOMI	  Lymphatic: No lymphadenopathy  Cardiovascular: Normal S1 S2, No JVD, No murmurs, No edema  Respiratory: Lungs clear to auscultation	  Psychiatry: A & O x 3, Mood & affect appropriate  Gastrointestinal:  Soft, Non-tender, + BS	  Skin: No rashes, No ecchymoses, No cyanosis	  Neurologic: weakness   Extremities: Normal range of motion, No clubbing, cyanosis or edema  Vascular: Peripheral pulses palpable 2+ bilaterally    TELEMETRY: 	SR    ECG:  	NSr, no acute ischemic stt change   RADIOLOGY:     < from: MR Head No Cont (12.19.18 @ 10:31) >    EXAM:  MR ANGIO BRAIN                          EXAM:  MR ANGIO NECK WAW IC                          EXAM:  MR BRAIN                            PROCEDURE DATE:  12/19/2018            INTERPRETATION:  Contrast-enhanced MRI of the brain and MRA of theneck.   Noncontrast MRA of the brain.    CLINICAL INDICATION: Intermittent slurred speech    TECHNIQUE: Multiplanar multisequence MRI of the brain and 3-D time of   flight images through the neck were obtained before and after the   dynamic, intravenous administration of  7.5 cc of Gadavist. 0 cc were   discarded.  Noncontrast 3-D time-of-flight images through the brain and   2-D time of flight images through the neck were obtained on the same   date.  Time of flight images were reformatted usingan MIP protocol   targeted over the head and neck.    COMPARISON: CT brain 12/18/2018.    FINDINGS:     MRI BRAIN:    There are small acute infarcts involving the left  thalamus. Possible   small acute infarcts along the mesial aspect of the left temporal horn.   No evidence for hemorrhagic transformation.    Moderate white matter microvascular ischemic disease    No hydrocephalus, midline shift, large mass effect, vasogenic edema, or   acute intracranial hemorrhage. Signal voids are seen within the major   intracranial vessels consistent with their patency.    No abnormal parenchymal or leptomeningeal enhancement.    The visualized paranasal sinuses and mastoid air cells are clear. The   orbits, sellar and suprasellar structures, and craniocervical junction   are unremarkable.    MRA BRAIN:    There is an abrupt cut off of the proximal left P2 segment of the left   PCA, consistent with vessel occlusion. The age of this occlusion is   indeterminate.    Apparent mild narrowing of the rightM1 segment may be artifactual in   nature. Normal flow related signal within the more distal right MCA is   noted.    Normal flow-related enhancement of the bilateral anterior, left middle,   and right posterior cerebral arteries, the basilar artery,the intradural   vertebral arteries, and the skull base/intradural internal carotid   arteries.    No vascular aneurysm. Anterior communicating artery is present.    MRA NECK:    There is good flow-related signal within the bilateral common and   internal carotid arteries. The vertebral arteries are well visualized.   There is no flow-limiting stenosis or vascular aneurysm.  No evidence for   arterial dissection.    Carotid bifurcations and origins of the common carotid and vertebral   arteries are unremarkable.    IMPRESSION:    MRI BRAIN:    Small acute infarcts involving the left thalamus. Possible small acute   infarcts along the mesial aspect of the left temporal horn. No evidence   for hemorrhagic transformation.    Moderate white matter microvascular ischemic disease.    No abnormal parenchymal or leptomeningeal enhancement.    MRA BRAIN:    Abrupt cut off of the proximal left P2 segment of the left PCA,   consistent with vessel occlusion. The age of this occlusion is   indeterminate.    Apparent mild narrowing of the right M1 segment may be artifactual in   nature. Normal flow-related signal within the more distal right MCA.    No vascular aneurysm    MRA NECK:    No flow-limiting stenosis. Carotid bifurcations and origins of the common   carotid and vertebral arteries unremarkable.    Dr. Guzman discussed these findings with Dr. Rodriguez  on 12/19/2018 10:58   AM with read back.                            DUSTIN GUZMAN M.D., ATTENDING RADIOLOGIST  This document has been electronically signed. Dec 19 2018 11:01AM                < end of copied text >         from: CT Brain Stroke Protocol (12.18.18 @ 20:29) >    EXAM:  CT BRAIN STROKE PROTOCOL                            PROCEDURE DATE:  12/18/2018            INTERPRETATION:  CLINICAL INFORMATION: Right facial droop and numbness.    COMPARISON: None available.    TECHNIQUE: Multiple axial CT images of the head from the base of the   skull to the vertex were obtained without the administration of   intravenous contrast. Coronal and sagittal reformats were submitted.       FINDINGS:    No intracranial hemorrhage, mass effect, or midline shift. Chronic white   matter microvascular ischemic changes. Age related cerebral volume loss.   No hydrocephalus. The basal cisterns are not effaced.     Visualized paranasal sinuses and mastoid air cells are clear. No   depressed calvarial fracture. Visualized soft tissues are unremarkable.    IMPRESSION:   No intracranial hemorrhage, mass effect, or midline shift. Chronic white   matter microvascular ischemic changes.    These findings were discussed with Dr. Davenport at 12/18/2018 8:36 PM by Dr. Durham.   `                SELVIN DURHAM M.D., RADIOLOGY RESIDENT  This document has been electronically signed.  PAM CORDON M.D., ATTENDING RADIOLOGIST  This document has been electronically signed. Dec 18 2018  8:43PM                < end of copied text >    OTHER: 	  	  LABS:	 	    CARDIAC MARKERS:                                  15.2   5.9   )-----------( 181      ( 18 Dec 2018 20:27 )             44.1     12-20    140  |  103  |  20  ----------------------------<  122<H>  3.5   |  25  |  0.82    Ca    9.0      20 Dec 2018 06:32    TPro  7.8  /  Alb  4.5  /  TBili  0.4  /  DBili  x   /  AST  25  /  ALT  17  /  AlkPhos  118  12-18    proBNP:   Lipid Profile:   HgA1c:   TSH:

## 2018-12-20 NOTE — DISCHARGE NOTE ADULT - HOSPITAL COURSE
78-year-old left handed white woman first evaluated at [facility] on 12/19/18 with right-sided numbness. When she walked to the MRI at [facility], she found herself staggering slightly to the right.     Impression. She has a history of atrial fibrillation, but stopped her Eliquis on her own over a year prior to admission because she felt well and that the medication was unnecessary. On 12/17/18, she developed tingling affecting the right side of her face and right hand, which persisted. 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 MRA brain (12/19/18) showed abrupt occlusion of the P2 segment of the left PCA. 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    Patient continued to ambulate independently during her brief admission. Did also noticed some very mild dysarthria upon follow up examination. Patient had echocardiogram revealing ___________________________.   She will be started on dabigatran for anticoagulation and will follow up with physical therapy and speech therapy outpatient. 78-year-old left handed white woman first evaluated at [facility] on 12/19/18 with right-sided numbness. When she walked to the MRI at [facility], she found herself staggering slightly to the right.     Impression. She has a history of atrial fibrillation, but stopped her Eliquis on her own over a year prior to admission because she felt well and that the medication was unnecessary. On 12/17/18, she developed tingling affecting the right side of her face and right hand, which persisted. 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 MRA brain (12/19/18) showed abrupt occlusion of the P2 segment of the left PCA. 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    Patient continued to ambulate independently during her brief admission. Did also noticed some very mild dysarthria upon follow up examination.   She will be started on dabigatran for anticoagulation and will follow up with physical therapy and speech therapy outpatient. 78-year-old left handed white woman first evaluated at [facility] on 12/19/18 with right-sided numbness. When she walked to the MRI at [facility], she found herself staggering slightly to the right.     Impression. She has a history of atrial fibrillation, but stopped her Eliquis on her own over a year prior to admission because she felt well and that the medication was unnecessary. On 12/17/18, she developed tingling affecting the right side of her face and right hand, which persisted. 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 MRA brain (12/19/18) showed abrupt occlusion of the P2 segment of the left PCA. 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    Patient continued to ambulate independently during her brief admission. Did also notice some very mild dysarthria upon follow up examination.   She will be started on dabigatran for anticoagulation and will follow up with physical therapy and speech therapy outpatient.

## 2018-12-20 NOTE — DISCHARGE NOTE ADULT - PLAN OF CARE
Reduce risk for recurrence Outpatient PT and speech therapy.   Continue pradaxa, please follow up with cardiologist.

## 2018-12-21 ENCOUNTER — CHART COPY (OUTPATIENT)
Age: 78
End: 2018-12-21

## 2018-12-24 ENCOUNTER — APPOINTMENT (OUTPATIENT)
Dept: INTERNAL MEDICINE | Facility: CLINIC | Age: 78
End: 2018-12-24
Payer: MEDICARE

## 2018-12-24 VITALS
WEIGHT: 123 LBS | BODY MASS INDEX: 25.82 KG/M2 | OXYGEN SATURATION: 98 % | SYSTOLIC BLOOD PRESSURE: 130 MMHG | HEART RATE: 71 BPM | TEMPERATURE: 98.4 F | HEIGHT: 58 IN | RESPIRATION RATE: 14 BRPM | DIASTOLIC BLOOD PRESSURE: 90 MMHG

## 2018-12-24 VITALS — DIASTOLIC BLOOD PRESSURE: 90 MMHG | SYSTOLIC BLOOD PRESSURE: 132 MMHG

## 2018-12-24 PROCEDURE — 99495 TRANSJ CARE MGMT MOD F2F 14D: CPT

## 2018-12-24 RX ORDER — LOSARTAN POTASSIUM 100 MG/1
100 TABLET, FILM COATED ORAL DAILY
Qty: 30 | Refills: 2 | Status: DISCONTINUED | COMMUNITY
Start: 2018-08-02 | End: 2018-12-24

## 2018-12-24 NOTE — PHYSICAL EXAM
[No Acute Distress] : no acute distress [Well Nourished] : well nourished [Well Developed] : well developed [Well-Appearing] : well-appearing [Normal Outer Ear/Nose] : the outer ears and nose were normal in appearance [Normal Oropharynx] : the oropharynx was normal [Supple] : supple [No Lymphadenopathy] : no lymphadenopathy [No Respiratory Distress] : no respiratory distress  [Clear to Auscultation] : lungs were clear to auscultation bilaterally [Normal Rate] : normal rate  [Regular Rhythm] : with a regular rhythm [No Edema] : there was no peripheral edema [Soft] : abdomen soft [Non Tender] : non-tender [Grossly Normal Strength/Tone] : grossly normal strength/tone [Normal Gait] : normal gait [Coordination Grossly Intact] : coordination grossly intact [Normal Affect] : the affect was normal [Normal Insight/Judgement] : insight and judgment were intact [de-identified] : decreased sensation right side of face

## 2018-12-24 NOTE — HISTORY OF PRESENT ILLNESS
[Post-hospitalization from ___ Hospital] : Post-hospitalization from [unfilled] Hospital [Patient Contacted By: ____] : and contacted by [unfilled] [Admitted on: ___] : The patient was admitted on [unfilled] [Discharged on ___] : discharged on [unfilled] [FreeTextEntry2] : Pt had a stroke on 12/18/18 with symptoms of numbness on right side of face, right hand tingling, walking sideways, right facial droop and slurred speech. She has been forgetful too. She has PAF and on

## 2018-12-28 ENCOUNTER — APPOINTMENT (OUTPATIENT)
Dept: NEUROLOGY | Facility: CLINIC | Age: 78
End: 2018-12-28
Payer: MEDICARE

## 2018-12-28 VITALS
HEART RATE: 71 BPM | BODY MASS INDEX: 25.82 KG/M2 | WEIGHT: 123 LBS | HEIGHT: 58 IN | SYSTOLIC BLOOD PRESSURE: 165 MMHG | DIASTOLIC BLOOD PRESSURE: 79 MMHG

## 2018-12-28 DIAGNOSIS — Z78.9 OTHER SPECIFIED HEALTH STATUS: ICD-10-CM

## 2018-12-28 DIAGNOSIS — Z82.3 FAMILY HISTORY OF STROKE: ICD-10-CM

## 2018-12-28 PROCEDURE — 99496 TRANSJ CARE MGMT HIGH F2F 7D: CPT

## 2018-12-28 RX ORDER — DABIGATRAN ETEXILATE MESYLATE 150 MG/1
150 CAPSULE ORAL
Qty: 60 | Refills: 0 | Status: DISCONTINUED | COMMUNITY
Start: 2018-12-20 | End: 2018-12-28

## 2018-12-31 ENCOUNTER — OTHER (OUTPATIENT)
Age: 78
End: 2018-12-31

## 2019-01-04 ENCOUNTER — OTHER (OUTPATIENT)
Age: 79
End: 2019-01-04

## 2019-01-07 ENCOUNTER — EMERGENCY (EMERGENCY)
Facility: HOSPITAL | Age: 79
LOS: 1 days | Discharge: HOME | End: 2019-01-07
Attending: EMERGENCY MEDICINE | Admitting: EMERGENCY MEDICINE
Payer: COMMERCIAL

## 2019-01-07 VITALS — HEIGHT: 58 IN | WEIGHT: 125 LBS

## 2019-01-07 DIAGNOSIS — Z90.89 ACQUIRED ABSENCE OF OTHER ORGANS: Chronic | ICD-10-CM

## 2019-01-07 DIAGNOSIS — Z90.49 ACQUIRED ABSENCE OF OTHER SPECIFIED PARTS OF DIGESTIVE TRACT: Chronic | ICD-10-CM

## 2019-01-07 DIAGNOSIS — Z98.890 OTHER SPECIFIED POSTPROCEDURAL STATES: Chronic | ICD-10-CM

## 2019-01-07 LAB
ANION GAP SERPL CALC-SCNC: 8 MMOL/L — SIGNIFICANT CHANGE UP (ref 5–17)
APTT BLD: 67.2 SEC — HIGH (ref 28.5–37)
BASOPHILS # BLD AUTO: 0.03 K/UL — SIGNIFICANT CHANGE UP (ref 0–0.2)
BASOPHILS NFR BLD AUTO: 0.3 % — SIGNIFICANT CHANGE UP (ref 0–2)
BUN SERPL-MCNC: 17 MG/DL — SIGNIFICANT CHANGE UP (ref 7–23)
CALCIUM SERPL-MCNC: 8.7 MG/DL — SIGNIFICANT CHANGE UP (ref 8.5–10.1)
CHLORIDE SERPL-SCNC: 108 MMOL/L — SIGNIFICANT CHANGE UP (ref 96–108)
CO2 SERPL-SCNC: 28 MMOL/L — SIGNIFICANT CHANGE UP (ref 22–31)
CREAT SERPL-MCNC: 0.81 MG/DL — SIGNIFICANT CHANGE UP (ref 0.5–1.3)
EOSINOPHIL # BLD AUTO: 0.11 K/UL — SIGNIFICANT CHANGE UP (ref 0–0.5)
EOSINOPHIL NFR BLD AUTO: 1.2 % — SIGNIFICANT CHANGE UP (ref 0–6)
GLUCOSE SERPL-MCNC: 104 MG/DL — HIGH (ref 70–99)
HCT VFR BLD CALC: 41.1 % — SIGNIFICANT CHANGE UP (ref 34.5–45)
HGB BLD-MCNC: 13.6 G/DL — SIGNIFICANT CHANGE UP (ref 11.5–15.5)
IMM GRANULOCYTES NFR BLD AUTO: 0.3 % — SIGNIFICANT CHANGE UP (ref 0–1.5)
INR BLD: 1.36 RATIO — HIGH (ref 0.88–1.16)
LYMPHOCYTES # BLD AUTO: 1.86 K/UL — SIGNIFICANT CHANGE UP (ref 1–3.3)
LYMPHOCYTES # BLD AUTO: 20.4 % — SIGNIFICANT CHANGE UP (ref 13–44)
MCHC RBC-ENTMCNC: 29.2 PG — SIGNIFICANT CHANGE UP (ref 27–34)
MCHC RBC-ENTMCNC: 33.1 GM/DL — SIGNIFICANT CHANGE UP (ref 32–36)
MCV RBC AUTO: 88.4 FL — SIGNIFICANT CHANGE UP (ref 80–100)
MONOCYTES # BLD AUTO: 0.8 K/UL — SIGNIFICANT CHANGE UP (ref 0–0.9)
MONOCYTES NFR BLD AUTO: 8.8 % — SIGNIFICANT CHANGE UP (ref 2–14)
NEUTROPHILS # BLD AUTO: 6.27 K/UL — SIGNIFICANT CHANGE UP (ref 1.8–7.4)
NEUTROPHILS NFR BLD AUTO: 69 % — SIGNIFICANT CHANGE UP (ref 43–77)
NRBC # BLD: 0 /100 WBCS — SIGNIFICANT CHANGE UP (ref 0–0)
PLATELET # BLD AUTO: 191 K/UL — SIGNIFICANT CHANGE UP (ref 150–400)
POTASSIUM SERPL-MCNC: 3.5 MMOL/L — SIGNIFICANT CHANGE UP (ref 3.5–5.3)
POTASSIUM SERPL-SCNC: 3.5 MMOL/L — SIGNIFICANT CHANGE UP (ref 3.5–5.3)
PROTHROM AB SERPL-ACNC: 15.7 SEC — HIGH (ref 10–12.9)
RBC # BLD: 4.65 M/UL — SIGNIFICANT CHANGE UP (ref 3.8–5.2)
RBC # FLD: 13.3 % — SIGNIFICANT CHANGE UP (ref 10.3–14.5)
SODIUM SERPL-SCNC: 144 MMOL/L — SIGNIFICANT CHANGE UP (ref 135–145)
WBC # BLD: 9.1 K/UL — SIGNIFICANT CHANGE UP (ref 3.8–10.5)
WBC # FLD AUTO: 9.1 K/UL — SIGNIFICANT CHANGE UP (ref 3.8–10.5)

## 2019-01-07 PROCEDURE — 85027 COMPLETE CBC AUTOMATED: CPT

## 2019-01-07 PROCEDURE — 85730 THROMBOPLASTIN TIME PARTIAL: CPT

## 2019-01-07 PROCEDURE — 99284 EMERGENCY DEPT VISIT MOD MDM: CPT | Mod: 25

## 2019-01-07 PROCEDURE — 99285 EMERGENCY DEPT VISIT HI MDM: CPT

## 2019-01-07 PROCEDURE — 93005 ELECTROCARDIOGRAM TRACING: CPT

## 2019-01-07 PROCEDURE — 70450 CT HEAD/BRAIN W/O DYE: CPT | Mod: 26

## 2019-01-07 PROCEDURE — 80048 BASIC METABOLIC PNL TOTAL CA: CPT

## 2019-01-07 PROCEDURE — 85610 PROTHROMBIN TIME: CPT

## 2019-01-07 PROCEDURE — 36415 COLL VENOUS BLD VENIPUNCTURE: CPT

## 2019-01-07 PROCEDURE — 82962 GLUCOSE BLOOD TEST: CPT

## 2019-01-07 PROCEDURE — 70450 CT HEAD/BRAIN W/O DYE: CPT

## 2019-01-07 NOTE — ED PROVIDER NOTE - OBJECTIVE STATEMENT
77 yo white female with H/O CVA 12/18/18 (Manifested by Right sided weakness and numbness), HTN and A. Fib who was well up until 1-hour ago who while watching T.V developed acute onset of numbness/novacaine sensation to right side of face as well as RUE and RLE. No headache, diplopia, weakness and or facial asymmetry. Symptoms lasted for 15-minutes then resolved entirely.

## 2019-01-07 NOTE — ED ADULT NURSE NOTE - OBJECTIVE STATEMENT
patient a/o x 4 with a calm affect c/o right sided numbness beginning while at home tonight and subsiding 10 minutes later.  family brought patient in for lab work and CT to r/o stroke, patient had first stroke Dec 18th. patient denies any symptoms currently

## 2019-01-07 NOTE — ED ADULT NURSE NOTE - NSIMPLEMENTINTERV_GEN_ALL_ED
Implemented All Fall Risk Interventions:  [place] to call system. Call bell, personal items and telephone within reach. Instruct patient to call for assistance. Room bathroom lighting operational. Non-slip footwear when patient is off stretcher. Physically safe environment: no spills, clutter or unnecessary equipment. Stretcher in lowest position, wheels locked, appropriate side rails in place. Provide visual cue, wrist band, yellow gown, etc. Monitor gait and stability. Monitor for mental status changes and reorient to person, place, and time. Review medications for side effects contributing to fall risk. Reinforce activity limits and safety measures with patient and family.

## 2019-01-07 NOTE — ED ADULT NURSE NOTE - CHPI ED NUR SYMPTOMS NEG
no loss of consciousness/no numbness/no vomiting/no weakness/no nausea/no change in level of consciousness/no blurred vision/no dizziness/no confusion/no fever

## 2019-01-08 VITALS
RESPIRATION RATE: 14 BRPM | DIASTOLIC BLOOD PRESSURE: 87 MMHG | TEMPERATURE: 98 F | OXYGEN SATURATION: 100 % | SYSTOLIC BLOOD PRESSURE: 137 MMHG | HEART RATE: 80 BPM

## 2019-01-17 ENCOUNTER — OUTPATIENT (OUTPATIENT)
Dept: OUTPATIENT SERVICES | Facility: HOSPITAL | Age: 79
LOS: 1 days | End: 2019-01-17

## 2019-01-17 DIAGNOSIS — Z90.49 ACQUIRED ABSENCE OF OTHER SPECIFIED PARTS OF DIGESTIVE TRACT: Chronic | ICD-10-CM

## 2019-01-17 DIAGNOSIS — Z90.89 ACQUIRED ABSENCE OF OTHER ORGANS: Chronic | ICD-10-CM

## 2019-01-17 DIAGNOSIS — Z98.890 OTHER SPECIFIED POSTPROCEDURAL STATES: Chronic | ICD-10-CM

## 2019-01-18 DIAGNOSIS — F06.8 OTHER SPECIFIED MENTAL DISORDERS DUE TO KNOWN PHYSIOLOGICAL CONDITION: ICD-10-CM

## 2019-01-19 PROBLEM — I63.9 CEREBRAL INFARCTION, UNSPECIFIED: Chronic | Status: ACTIVE | Noted: 2019-01-07

## 2019-01-22 ENCOUNTER — APPOINTMENT (OUTPATIENT)
Dept: NEUROLOGY | Facility: CLINIC | Age: 79
End: 2019-01-22
Payer: MEDICARE

## 2019-01-22 VITALS
HEIGHT: 58 IN | DIASTOLIC BLOOD PRESSURE: 80 MMHG | HEART RATE: 72 BPM | BODY MASS INDEX: 25.82 KG/M2 | SYSTOLIC BLOOD PRESSURE: 142 MMHG | WEIGHT: 123 LBS

## 2019-01-22 DIAGNOSIS — I69.311 MEMORY DEFICIT FOLLOWING CEREBRAL INFARCTION: ICD-10-CM

## 2019-01-22 DIAGNOSIS — R06.83 SNORING: ICD-10-CM

## 2019-01-22 DIAGNOSIS — I69.320 APHASIA FOLLOWING CEREBRAL INFARCTION: ICD-10-CM

## 2019-01-22 PROCEDURE — 99214 OFFICE O/P EST MOD 30 MIN: CPT

## 2019-01-22 NOTE — DATA REVIEWED
[de-identified] : MRI BRAIN w/o Cont (12.19.18)\par Small acute infarcts involving the left thalamus. Possible small acute infarcts along the mesial aspect of the left temporal horn. No evidence for hemorrhagic transformation.Moderate white matter microvascular ischemic disease.No abnormal parenchymal or leptomeningeal enhancement.\par  [de-identified] : MRA BRAIN w/o Cont (12.19.18)\par Abrupt cut off of the proximal left P2 segment of the left PCA, consistent with vessel occlusion. The age of this occlusion is indeterminate.Apparent mild narrowing of the right M1 segment may be artifactual in \par nature. Normal flow-related signal within the more distal right MCA.No vascular aneurysm\par \par MRA NECK w/ IV Cont (12.19.18)\par No flow-limiting stenosis. Carotid bifurcations and origins of the common carotid and vertebral arteries unremarkable.\par \par CT Head No Cont (12.18.18)\par No intracranial hemorrhage, mass effect, or midline shift. Chronic white matter microvascular ischemic changes\par

## 2019-01-22 NOTE — REVIEW OF SYSTEMS
[As Noted in HPI] : as noted in HPI [Palpitations] : palpitations [Negative] : Musculoskeletal [FreeTextEntry4] : snoring

## 2019-01-22 NOTE — PHYSICAL EXAM
[FreeTextEntry1] : Examination:\par Constitutional: normal, no apparent distress\par Eyes: normal conjunctiva b/l, no ptosis, visual fields full\par oropharynx: slightly narrow\par Respiratory: no respiratory distress, normal effort, normal auscultation\par Cardiovascular: normal rate, rhythm, no murmurs\par Neck: supple, no masses\par Vascular: carotids normal\par Skin: normal color, no rashes\par Psych: normal mood, affect\par \par Neurological:\par Memory: normal memory, oriented to person, place, time\par Language intact/no aphasia\par Cranial Nerves: II-XII intact, Pupils equally round and reactive to light, ocular muscles/movements intact, no ptosis, no facial weakness, tongue protrudes normally in the midline, \par Motor: normal tone, no pronator drift, full strength in all four extremities in the proximal and distal muscle groups\par Coordination: Fine motor movements intact, rapid alternating movements intact, finger to nose intact bilaterally\par Sensory: intact to light touch, vibration, joint position sense, negative Romberg examination\par DTRs: symmetric, 2+ in b/l triceps, 2+ in b/l biceps, 2+ in b/l brachioradialis, 2+ in bilateral patellars, 2+ in left Achilles, absent on right, Babinskis negative bilaterally\par Gait: narrow based, steady, able to walk on heels, toes, tandem gait

## 2019-01-22 NOTE — DISCUSSION/SUMMARY
[FreeTextEntry1] : Mrs. Modi is a 78 year old woman with a history of atrial fibrillation and recent ischemic stroke.\par She is here to be evaluate for obstructive sleep apnea.\par She does have a history of snoring and a slightly narrow oropharynx. \par We discussed the risks of untreated sleep apnea including sleepiness, hypertension, increased risk for heart disease and stroke, worsening seizure frequency and cognitive impairment.\par I am ordering a home sleep study to help clarify whether or not she has obstructive sleep apnea.\par We discussed different potential treatment options and will discuss these in more detail if she is found to have LINDA.\par I will follow up with her after her home sleep study, sooner if needed.

## 2019-01-22 NOTE — HISTORY OF PRESENT ILLNESS
[FreeTextEntry1] : Ms. Harkins presents for sleep evaluation after hospitalization for a left thalamic infarct on 12/18/18.\par She is accompanied today by her daughter and .\par She sleeps from about 10:30-11 PM until about 7-7:30 AM with a 30-60 minute interruption at about 3 AM. \par She feels well rested when she wakes up.\par She drinks one cup of coffee per day.\par Since her stroke she takes a 1-2 hour nap in the afternoon. She never napped prior to the stroke.\par \par Her  says that she does snore. Sometimes it wakes him up, he will tap him and she wakes up.\par She does not wake up with choking/gasping for air and is not aware of snort arousals.\par She usually sleeps on her side but since the stroke she is more comfortable on her back. Her  is not aware of any difference in volume in snoring with positional change. He says that the snoring is soft. \par He has not been aware of pauses in breathing during her sleep.\par \par She had her tonsils removed in childhood.\par She denies any symptoms of Restless Legs Syndrome. Her  does not think that she kicks her legs in her sleep. \par She is not a vegetarian. She has no history of anemia or kidney disease.\par \par There is no history of dream enactment behavior, hypnagogic/hypnopompic hallucinations, sleep paralysis or cataplexy.\par \par Her Coulters Sleepiness Scale Score is 3/24.\par

## 2019-02-05 ENCOUNTER — APPOINTMENT (OUTPATIENT)
Dept: NEUROLOGY | Facility: CLINIC | Age: 79
End: 2019-02-05
Payer: MEDICARE

## 2019-02-05 PROCEDURE — 93880 EXTRACRANIAL BILAT STUDY: CPT

## 2019-02-05 PROCEDURE — 93892 TCD EMBOLI DETECT W/O INJ: CPT

## 2019-02-05 PROCEDURE — 93886 INTRACRANIAL COMPLETE STUDY: CPT

## 2019-02-06 ENCOUNTER — APPOINTMENT (OUTPATIENT)
Dept: NEUROLOGY | Facility: CLINIC | Age: 79
End: 2019-02-06

## 2019-02-07 ENCOUNTER — APPOINTMENT (OUTPATIENT)
Dept: PULMONOLOGY | Facility: CLINIC | Age: 79
End: 2019-02-07

## 2019-02-07 ENCOUNTER — APPOINTMENT (OUTPATIENT)
Dept: NEUROLOGY | Facility: CLINIC | Age: 79
End: 2019-02-07
Payer: MEDICARE

## 2019-02-07 PROCEDURE — 95806 SLEEP STUDY UNATT&RESP EFFT: CPT

## 2019-02-20 ENCOUNTER — APPOINTMENT (OUTPATIENT)
Dept: NEUROLOGY | Facility: CLINIC | Age: 79
End: 2019-02-20
Payer: MEDICARE

## 2019-02-20 VITALS
WEIGHT: 125 LBS | HEART RATE: 68 BPM | BODY MASS INDEX: 26.24 KG/M2 | SYSTOLIC BLOOD PRESSURE: 150 MMHG | HEIGHT: 58 IN | DIASTOLIC BLOOD PRESSURE: 80 MMHG

## 2019-02-20 PROCEDURE — 99215 OFFICE O/P EST HI 40 MIN: CPT

## 2019-03-05 ENCOUNTER — OTHER (OUTPATIENT)
Age: 79
End: 2019-03-05
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
## 2019-03-05 NOTE — PHYSICAL EXAM
[General Appearance - Alert] : alert [General Appearance - In No Acute Distress] : in no acute distress [Oriented To Time, Place, And Person] : oriented to person, place, and time [Impaired Insight] : insight and judgment were intact [Person] : oriented to person [Place] : oriented to place [Time] : oriented to time [Concentration Intact] : normal concentrating ability [Visual Intact] : visual attention was ~T not ~L decreased [Naming Objects] : no difficulty naming common objects [Repeating Phrases] : no difficulty repeating a phrase [Fluency] : fluency intact [Reading] : reading intact [Cranial Nerves Optic (II)] : visual acuity intact bilaterally,  visual fields full to confrontation, pupils equal round and reactive to light [Cranial Nerves Oculomotor (III)] : extraocular motion intact [Cranial Nerves Trigeminal (V)] : facial sensation intact symmetrically [Cranial Nerves Facial (VII)] : face symmetrical [Cranial Nerves Vestibulocochlear (VIII)] : hearing was intact bilaterally [Cranial Nerves Glossopharyngeal (IX)] : tongue and palate midline [Cranial Nerves Accessory (XI - Cranial And Spinal)] : head turning and shoulder shrug symmetric [Cranial Nerves Hypoglossal (XII)] : there was no tongue deviation with protrusion [Motor Tone] : muscle tone was normal in all four extremities [Motor Strength] : muscle strength was normal in all four extremities [No Muscle Atrophy] : normal bulk in all four extremities [5] : ankle plantar flexion 5/5 [Sensation Tactile Decrease] : light touch was intact [Proprioception] : proprioception was intact [Balance] : balance was intact [Coordination Dysmetria Impaired Heel-Shin Using Right Heel] : present on the ride side [2+] : Ankle jerk left 2+ [Sclera] : the sclera and conjunctiva were normal [PERRL With Normal Accommodation] : pupils were equal in size, round, reactive to light, with normal accommodation [Full Visual Field] : full visual field [Outer Ear] : the ears and nose were normal in appearance [Oropharynx] : the oropharynx was normal [Neck Appearance] : the appearance of the neck was normal [] : no respiratory distress [Auscultation Breath Sounds / Voice Sounds] : lungs were clear to auscultation bilaterally [Apical Impulse] : the apical impulse was normal [Heart Rate And Rhythm] : heart rate was normal and rhythm regular [Arterial Pulses Carotid] : carotid pulses were normal with no bruits [Edema] : there was no peripheral edema [Bowel Sounds] : normal bowel sounds [Abnormal Walk] : normal gait [Normal Station and Gait] : the gait and station were normal for the patient's age [Skin Color & Pigmentation] : normal skin color and pigmentation [Paresis Pronator Drift Right-Sided] : no pronator drift on the right [Paresis Pronator Drift Left-Sided] : no pronator drift on the left [Motor Strength Upper Extremities Bilaterally] : strength was normal in both upper extremities [Motor Strength Lower Extremities Bilaterally] : strength was normal in both lower extremities [Hand Weakness Right] : normal hand  [Hand Weakness Left] : normal hand  [Limited Balance] : balance was intact [Past-pointing] : there was no past-pointing [Tremor] : no tremor present [Dysdiadochokinesia On The Left] : not present on the left side [Coordination - Dysmetria Impaired Finger-to-Nose Left Only] : not present on the left side [Coordination Dysmetria Impaired Heel-to-Shin Using Left Heel] : not present on the left side [Plantar Reflex Right Only] : normal on the right [Plantar Reflex Left Only] : normal on the left [FreeTextEntry7] : mild swaying with romberg [FreeTextEntry8] : mild dysmetria on right. decreased fine finger movements on right. unable to perform heal to toe (at baseline)

## 2019-03-05 NOTE — REVIEW OF SYSTEMS
[Feeling Tired] : feeling tired [Numbness] : numbness [Feeling Poorly] : not feeling poorly [Confused or Disoriented] : no confusion [Memory Lapses or Loss] : no memory loss [Decr. Concentrating Ability] : no decrease in concentrating ability [Facial Weakness] : no facial weakness [Arm Weakness] : no arm weakness [Hand Weakness] : no hand weakness [Leg Weakness] : no leg weakness [Dizziness] : no dizziness [Fainting] : no fainting [Difficulty Walking] : no difficulty walking [Frequent Falls] : not falling

## 2019-03-05 NOTE — DISCUSSION/SUMMARY
[Antithrombotic therapy with ___] : antithrombotic therapy with  [unfilled] [Intensive Blood Pressure Control] : intensive blood pressure control [Lipid Lowering Therapy] : lipid lowering therapy [Patient encouraged to discuss with Primary MD] : I encouraged the patient to discuss these important issues with ~his/her~ primary care doctor [Goals and Counseling] : I have reviewed the goals of stroke risk factor modification. I counseled the patient on measures to reduce stroke risk, including the importance of medication compliance, risk factor control, exercise, healthy diet and avoidance of smoking. I reviewed stroke warning signs and symptoms and appropriate actions to take if such occur. [FreeTextEntry1] : Impression. She has a history of atrial fibrillation, but stopped her Eliquis on her own over a year prior to admission because she felt well and that the medication was unnecessary. On 12/17/18, she developed tingling affecting the right side of her face and right hand, which persisted. 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 MRA brain (12/19/18) showed abrupt occlusion of the P2 segment of the left PCA. 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 Will start her on a high intensity statin at this time with Lipitor 40 mg.\par - Therapeutic anticoagulation ([id number] score>1) for secondary stroke prevention, indefinitely. If not contraindicated due to any specific reasons in the future. Continue with dabigatran for therapeutic anticoagulation considering atrial fibrillation. Advice to followup with his cardiologist for optimal dosing of dabigatran, should she have change in body weight, age or renal functions. Risks versus benefits, and adverse reactions associated with therapeutic anticoagulation with apixaban, including paradoxically, increased risk of stroke or MI associated with abrupt discontinuation of medications were discussed with patient in detail. Of note, eliquis is not covered by her insurance only Pradaxa is. One month supply sent to pharmacy. Will make an appt with Dr. Da Silva \par - No indication is to add aspirin to therapeutic anticoagulation unless he has a history of CAD, or cardiac stents.\par - Sleep  has ruled out sleep Apnea\par -Currently on Gabapentin 100 mg BID, will increase to TID with Avinash \par - Continue to monitor BP at home and notify PCP/Cardiology for readings >140/90. Educated on medication compliance and BP monitoring to prevent secondary stroke and systemic problems \par - Continue Speech therapy/Physical therapy/Occupational therapy as directed. \par - Follow up with PCP for statin management and primary care needs such as regular blood work including monitoring of HbA1c and cholesterol profile, to modify vascular risk factors \par - Carotid Doppler's and TCD to be obtained in 4-6 months\par -Follow up in 4 months\par - Educated on stroke/TIA signs/bleeding signs and symptoms and to call 911 if experiencing any of these symptoms\par \par All concerns and questions were addressed.\par

## 2019-03-20 ENCOUNTER — OTHER (OUTPATIENT)
Age: 79
End: 2019-03-20

## 2019-04-30 NOTE — PATIENT PROFILE ADULT. - ARE SIGNIFICANT INDICATORS COMPLETE.

## 2019-05-09 ENCOUNTER — APPOINTMENT (OUTPATIENT)
Dept: INTERNAL MEDICINE | Facility: CLINIC | Age: 79
End: 2019-05-09

## 2019-05-15 ENCOUNTER — APPOINTMENT (OUTPATIENT)
Dept: INTERNAL MEDICINE | Facility: CLINIC | Age: 79
End: 2019-05-15
Payer: MEDICARE

## 2019-05-15 VITALS
OXYGEN SATURATION: 95 % | DIASTOLIC BLOOD PRESSURE: 80 MMHG | SYSTOLIC BLOOD PRESSURE: 126 MMHG | WEIGHT: 124 LBS | TEMPERATURE: 98.2 F | RESPIRATION RATE: 14 BRPM | HEART RATE: 43 BPM | BODY MASS INDEX: 25.92 KG/M2

## 2019-05-15 DIAGNOSIS — Z23 ENCOUNTER FOR IMMUNIZATION: ICD-10-CM

## 2019-05-15 PROCEDURE — 99214 OFFICE O/P EST MOD 30 MIN: CPT | Mod: 25

## 2019-05-15 PROCEDURE — G0009: CPT

## 2019-05-15 PROCEDURE — 90732 PPSV23 VACC 2 YRS+ SUBQ/IM: CPT

## 2019-05-15 RX ORDER — DABIGATRAN ETEXILATE MESYLATE 150 MG/1
150 CAPSULE ORAL
Refills: 0 | Status: DISCONTINUED | COMMUNITY
End: 2019-05-15

## 2019-05-15 RX ORDER — LOSARTAN POTASSIUM 50 MG/1
50 TABLET, FILM COATED ORAL
Qty: 90 | Refills: 0 | Status: DISCONTINUED | COMMUNITY
Start: 2018-11-29 | End: 2019-05-15

## 2019-05-15 RX ORDER — POLYETHYLENE GLYCOL 3350, SODIUM SULFATE, SODIUM CHLORIDE, POTASSIUM CHLORIDE, ASCORBIC ACID, SODIUM ASCORBATE 7.5-2.691G
100 KIT ORAL
Qty: 1 | Refills: 0 | Status: DISCONTINUED | COMMUNITY
Start: 2018-06-01 | End: 2019-05-15

## 2019-05-15 RX ORDER — GABAPENTIN 100 MG/1
100 CAPSULE ORAL
Refills: 0 | Status: DISCONTINUED | COMMUNITY
End: 2019-05-15

## 2019-05-15 NOTE — PHYSICAL EXAM
[Well Nourished] : well nourished [No Acute Distress] : no acute distress [Well Developed] : well developed [Well-Appearing] : well-appearing [Supple] : supple [Normal Oropharynx] : the oropharynx was normal [No Lymphadenopathy] : no lymphadenopathy [Normal Rate] : normal rate  [Regular Rhythm] : with a regular rhythm [Soft] : abdomen soft [Non Tender] : non-tender [Grossly Normal Strength/Tone] : grossly normal strength/tone [Normal Insight/Judgement] : insight and judgment were intact [Normal Affect] : the affect was normal [de-identified] : decreased sensation right side of face

## 2019-05-15 NOTE — HISTORY OF PRESENT ILLNESS
[de-identified] : Pt is here for BP check and f/u of other conditions. She has PAF and had a stroke in December 2018. She is taking pradaxa now.  [FreeTextEntry1] : BP check

## 2019-06-24 ENCOUNTER — APPOINTMENT (OUTPATIENT)
Dept: NEUROLOGY | Facility: CLINIC | Age: 79
End: 2019-06-24
Payer: MEDICARE

## 2019-06-24 VITALS
BODY MASS INDEX: 26.03 KG/M2 | SYSTOLIC BLOOD PRESSURE: 119 MMHG | DIASTOLIC BLOOD PRESSURE: 75 MMHG | WEIGHT: 124 LBS | HEART RATE: 65 BPM | HEIGHT: 58 IN

## 2019-06-24 DIAGNOSIS — R40.0 SOMNOLENCE: ICD-10-CM

## 2019-06-24 DIAGNOSIS — R13.10 DYSPHAGIA, UNSPECIFIED: ICD-10-CM

## 2019-06-24 DIAGNOSIS — I63.9 CEREBRAL INFARCTION, UNSPECIFIED: ICD-10-CM

## 2019-06-24 PROCEDURE — 99215 OFFICE O/P EST HI 40 MIN: CPT

## 2019-06-24 PROCEDURE — 93886 INTRACRANIAL COMPLETE STUDY: CPT

## 2019-06-24 PROCEDURE — 93892 TCD EMBOLI DETECT W/O INJ: CPT

## 2019-06-24 NOTE — PHYSICAL EXAM
[General Appearance - Alert] : alert [Oriented To Time, Place, And Person] : oriented to person, place, and time [General Appearance - In No Acute Distress] : in no acute distress [Impaired Insight] : insight and judgment were intact [Place] : oriented to place [Person] : oriented to person [Concentration Intact] : normal concentrating ability [Time] : oriented to time [Visual Intact] : visual attention was ~T not ~L decreased [Naming Objects] : no difficulty naming common objects [Fluency] : fluency intact [Repeating Phrases] : no difficulty repeating a phrase [Cranial Nerves Optic (II)] : visual acuity intact bilaterally,  visual fields full to confrontation, pupils equal round and reactive to light [Reading] : reading intact [Cranial Nerves Oculomotor (III)] : extraocular motion intact [Cranial Nerves Facial (VII)] : face symmetrical [Cranial Nerves Trigeminal (V)] : facial sensation intact symmetrically [Cranial Nerves Glossopharyngeal (IX)] : tongue and palate midline [Cranial Nerves Vestibulocochlear (VIII)] : hearing was intact bilaterally [Motor Strength] : muscle strength was normal in all four extremities [Motor Tone] : muscle tone was normal in all four extremities [Cranial Nerves Hypoglossal (XII)] : there was no tongue deviation with protrusion [Cranial Nerves Accessory (XI - Cranial And Spinal)] : head turning and shoulder shrug symmetric [No Muscle Atrophy] : normal bulk in all four extremities [5] : ankle plantar flexion 5/5 [Balance] : balance was intact [Proprioception] : proprioception was intact [Sensation Tactile Decrease] : light touch was intact [Coordination Dysmetria Impaired Heel-Shin Using Right Heel] : present on the ride side [2+] : Ankle jerk left 2+ [PERRL With Normal Accommodation] : pupils were equal in size, round, reactive to light, with normal accommodation [Sclera] : the sclera and conjunctiva were normal [Full Visual Field] : full visual field [Outer Ear] : the ears and nose were normal in appearance [Oropharynx] : the oropharynx was normal [] : no respiratory distress [Neck Appearance] : the appearance of the neck was normal [Auscultation Breath Sounds / Voice Sounds] : lungs were clear to auscultation bilaterally [Apical Impulse] : the apical impulse was normal [Heart Rate And Rhythm] : heart rate was normal and rhythm regular [Arterial Pulses Carotid] : carotid pulses were normal with no bruits [Bowel Sounds] : normal bowel sounds [Edema] : there was no peripheral edema [Abnormal Walk] : normal gait [Normal Station and Gait] : the gait and station were normal for the patient's age [Skin Color & Pigmentation] : normal skin color and pigmentation [Paresis Pronator Drift Right-Sided] : no pronator drift on the right [Motor Strength Lower Extremities Bilaterally] : strength was normal in both lower extremities [Paresis Pronator Drift Left-Sided] : no pronator drift on the left [Motor Strength Upper Extremities Bilaterally] : strength was normal in both upper extremities [Limited Balance] : balance was intact [Hand Weakness Right] : normal hand  [Hand Weakness Left] : normal hand  [Past-pointing] : there was no past-pointing [Tremor] : no tremor present [Dysdiadochokinesia On The Left] : not present on the left side [Coordination - Dysmetria Impaired Finger-to-Nose Left Only] : not present on the left side [Coordination Dysmetria Impaired Heel-to-Shin Using Left Heel] : not present on the left side [Plantar Reflex Right Only] : normal on the right [Plantar Reflex Left Only] : normal on the left [FreeTextEntry7] : mild swaying with romberg [FreeTextEntry8] : mild dysmetria on right. decreased fine finger movements on right. unable to perform heal to toe (at baseline)

## 2019-06-24 NOTE — DISCUSSION/SUMMARY
[Antithrombotic therapy with ___] : antithrombotic therapy with  [unfilled] [Intensive Blood Pressure Control] : intensive blood pressure control [Lipid Lowering Therapy] : lipid lowering therapy [Goals and Counseling] : I have reviewed the goals of stroke risk factor modification. I counseled the patient on measures to reduce stroke risk, including the importance of medication compliance, risk factor control, exercise, healthy diet and avoidance of smoking. I reviewed stroke warning signs and symptoms and appropriate actions to take if such occur. [Patient encouraged to discuss with Primary MD] : I encouraged the patient to discuss these important issues with ~his/her~ primary care doctor [FreeTextEntry1] : Impression. She has a history of atrial fibrillation, but stopped her Eliquis on her own over a year prior to admission because she felt well and that the medication was unnecessary. On 12/17/18, she developed tingling affecting the right side of her face and right hand, which persisted. 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 MRA brain (12/19/18) showed abrupt occlusion of the P2 segment of the left PCA. 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 Will start her on a high intensity statin at this time with Lipitor 40 mg.\par - Therapeutic anticoagulation ([id number] score>1) for secondary stroke prevention, indefinitely. If not contraindicated due to any specific reasons in the future. Continue with dabigatran for therapeutic anticoagulation considering atrial fibrillation. Advice to followup with his cardiologist for optimal dosing of dabigatran, should she have change in body weight, age or renal functions. Risks versus benefits, and adverse reactions associated with therapeutic anticoagulation with apixaban, including paradoxically, increased risk of stroke or MI associated with abrupt discontinuation of medications were discussed with patient in detail. Of note, eliquis is not covered by her insurance only Pradaxa is. \par - No indication is to add aspirin to therapeutic anticoagulation unless he has a history of CAD, or cardiac stents.\par -Currently on Gabapentin 100 mg BID\par - Continue to monitor BP at home and notify PCP/Cardiology for readings >140/90. Educated on medication compliance and BP monitoring to prevent secondary stroke and systemic problems \par - Continue Speech therapy/Physical therapy/Occupational therapy as directed. \par - Follow up with PCP for statin management and primary care needs such as regular blood work including monitoring of HbA1c and cholesterol profile, to modify vascular risk factors \par - Carotid Doppler's and TCD done today, results reviewed \par -Follow up in 6 months\par - Educated on stroke/TIA signs/bleeding signs and symptoms and to call 911 if experiencing any of these symptoms\par \par All concerns and questions were addressed.\par

## 2019-06-24 NOTE — REVIEW OF SYSTEMS
[Feeling Tired] : feeling tired [Numbness] : numbness [Negative] : Eyes [Confused or Disoriented] : no confusion [Feeling Poorly] : not feeling poorly [Decr. Concentrating Ability] : no decrease in concentrating ability [Memory Lapses or Loss] : no memory loss [Facial Weakness] : no facial weakness [Arm Weakness] : no arm weakness [Hand Weakness] : no hand weakness [Leg Weakness] : no leg weakness [Dizziness] : no dizziness [Fainting] : no fainting [Frequent Falls] : not falling [Difficulty Walking] : no difficulty walking

## 2019-06-24 NOTE — END OF VISIT
[>50% of Time Spent on Counseling and Coordination of Care for  ___] : Greater than 50% of the encounter time was spent on counseling and coordination of care for [unfilled] [Time Spent: ___ minutes] : I have spent [unfilled] minutes of face to face time with the patient [] : Fellow [FreeTextEntry3] : Dr Patterson
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
## 2019-06-26 ENCOUNTER — APPOINTMENT (OUTPATIENT)
Dept: NEUROLOGY | Facility: CLINIC | Age: 79
End: 2019-06-26

## 2019-08-14 ENCOUNTER — APPOINTMENT (OUTPATIENT)
Dept: INTERNAL MEDICINE | Facility: CLINIC | Age: 79
End: 2019-08-14

## 2019-08-31 NOTE — PATIENT PROFILE ADULT - STATED REASON FOR ADMISSION
[Takes medication as prescribed] : takes [None] : Patient does not have any barriers to medication adherence Stroke

## 2019-09-25 ENCOUNTER — APPOINTMENT (OUTPATIENT)
Dept: INTERNAL MEDICINE | Facility: CLINIC | Age: 79
End: 2019-09-25
Payer: MEDICARE

## 2019-09-25 VITALS
TEMPERATURE: 98.8 F | SYSTOLIC BLOOD PRESSURE: 122 MMHG | DIASTOLIC BLOOD PRESSURE: 84 MMHG | HEART RATE: 67 BPM | BODY MASS INDEX: 26.13 KG/M2 | WEIGHT: 125 LBS | OXYGEN SATURATION: 97 % | RESPIRATION RATE: 14 BRPM

## 2019-09-25 DIAGNOSIS — Z87.09 PERSONAL HISTORY OF OTHER DISEASES OF THE RESPIRATORY SYSTEM: ICD-10-CM

## 2019-09-25 DIAGNOSIS — J39.2 OTHER DISEASES OF PHARYNX: ICD-10-CM

## 2019-09-25 LAB — S PYO AG SPEC QL IA: NORMAL

## 2019-09-25 PROCEDURE — 99214 OFFICE O/P EST MOD 30 MIN: CPT | Mod: 25

## 2019-09-25 PROCEDURE — 87880 STREP A ASSAY W/OPTIC: CPT | Mod: QW

## 2019-09-25 NOTE — HISTORY OF PRESENT ILLNESS
[Spouse] : spouse [FreeTextEntry8] : cc: s/t\par \par c/o s/t and fatigue for a week. No trouble breathing. She has been sleeping a lot. No fever. \par \par Has h/o rectocele and cystocele and sometimes problems urinating.\par \par c/o "pebble poop". Has been a couple months.

## 2019-09-25 NOTE — PHYSICAL EXAM
[Normal] : affect was normal and insight and judgment were intact [de-identified] : erythema left throat

## 2019-10-23 ENCOUNTER — APPOINTMENT (OUTPATIENT)
Dept: GASTROENTEROLOGY | Facility: CLINIC | Age: 79
End: 2019-10-23
Payer: MEDICARE

## 2019-10-23 VITALS
HEART RATE: 62 BPM | HEIGHT: 58 IN | BODY MASS INDEX: 25.61 KG/M2 | DIASTOLIC BLOOD PRESSURE: 84 MMHG | OXYGEN SATURATION: 97 % | SYSTOLIC BLOOD PRESSURE: 132 MMHG | WEIGHT: 122 LBS

## 2019-10-23 DIAGNOSIS — R10.9 UNSPECIFIED ABDOMINAL PAIN: ICD-10-CM

## 2019-10-23 DIAGNOSIS — R19.5 OTHER FECAL ABNORMALITIES: ICD-10-CM

## 2019-10-23 DIAGNOSIS — R10.32 LEFT LOWER QUADRANT PAIN: ICD-10-CM

## 2019-10-23 DIAGNOSIS — R10.30 LOWER ABDOMINAL PAIN, UNSPECIFIED: ICD-10-CM

## 2019-10-23 PROCEDURE — 99214 OFFICE O/P EST MOD 30 MIN: CPT

## 2019-10-23 RX ORDER — AMOXICILLIN 500 MG/1
500 TABLET, FILM COATED ORAL 3 TIMES DAILY
Qty: 21 | Refills: 0 | Status: DISCONTINUED | COMMUNITY
Start: 2019-09-25 | End: 2019-10-23

## 2019-10-23 NOTE — REASON FOR VISIT
[FreeTextEntry1] : Bilateral lower abdominal crampy discomfort with hard small bowel movements, previous CAT scan a year ago showing colitis of the descending colon after colonoscopy with biopsy

## 2019-10-23 NOTE — HISTORY OF PRESENT ILLNESS
[de-identified] : Dr. Shepherd Takes care of this very pleasant 79-year-old female\par \par The patient has been having chronic bilateral lower abdominal crampy pain for over a year\par \par It seems to be worse lately\par \par No fever or chills\par \par She's also had constipation over this period of time with hard small stools\par \par No blood per rectum\par \par Over a year ago she had a colonoscopy with biopsy, no significant pathology other than a polyp\par \par That evening she developed more abdominal pain and bleeding and required hospitalization\par \par CAT scan showed inflammatory changes of the descending colon no perforation\par \par She was treated medically on antibiotics and discharged\par \par Is no family history of colitis, inflammatory bowel disease, colon cancer,\par \par She's never had diverticulitis that she is aware of\par \par There's been no recent fever or chills\par \par Her appetite is good\par \par No nausea vomiting\par \par No weight loss\par \par She hasn't really tried any over-the-counter medication for the constipation and irregularity

## 2019-10-23 NOTE — ASSESSMENT
[FreeTextEntry1] : Impression\par \par Bilateral lower abdominal crampy discomfort, fairly chronic in nature\par \par Recently she feels it is getting worse\par \par Chronic constipation with small hard stools\par \par Colonoscopy last year with biopsy\par \par The patient was hospitalized at the colonoscopy because of bleeding and abdominal discomfort\par \par CAT scan post colonoscopy revealed colitis of the descending colon\par \par Since colonoscopy the patient had a mild stroke and is on blood thinning medication\par \par Suggest\par \par Blood work here today to include a CBC to look for signs of infection\par \par CAT scan of the abdomen and pelvis with IV and oral contrast to look for any evidence of diverticulitis, colitis or other pathology\par \par MiraLax on a daily basis\par \par Follow up with me\par \par Return to emergency room if needed\par \par The patient is instructed to call me or followup any time soon as needed\par We reviewed the possible differential diagnosis, possibility of diverticulitis, possibility of colitis, and other pathology\par \par Review CAT scan, which risks benefits and alternatives\par \par Review constipation, need for high-fiber diet, and need for MiraLax or other medication over-the-counter for now\par \par We reviewed the importance of return to hospital calling me if she has any further abdominal discomfort, today she is feeling well, abdominal examination was normal\par \par We spent over 25 minutes together with 50% of it for education\par

## 2019-10-24 ENCOUNTER — RESULT REVIEW (OUTPATIENT)
Age: 79
End: 2019-10-24

## 2019-10-24 LAB
BASOPHILS # BLD AUTO: 0.04 K/UL
BASOPHILS NFR BLD AUTO: 0.7 %
EOSINOPHIL # BLD AUTO: 0.11 K/UL
EOSINOPHIL NFR BLD AUTO: 1.9 %
FERRITIN SERPL-MCNC: 100 NG/ML
HCT VFR BLD CALC: 44.4 %
HGB BLD-MCNC: 13.8 G/DL
IMM GRANULOCYTES NFR BLD AUTO: 0.2 %
IRON SATN MFR SERPL: 31 %
IRON SERPL-MCNC: 106 UG/DL
LYMPHOCYTES # BLD AUTO: 1.42 K/UL
LYMPHOCYTES NFR BLD AUTO: 25 %
MAN DIFF?: NORMAL
MCHC RBC-ENTMCNC: 28.6 PG
MCHC RBC-ENTMCNC: 31.1 GM/DL
MCV RBC AUTO: 91.9 FL
MONOCYTES # BLD AUTO: 0.44 K/UL
MONOCYTES NFR BLD AUTO: 7.7 %
NEUTROPHILS # BLD AUTO: 3.67 K/UL
NEUTROPHILS NFR BLD AUTO: 64.5 %
PLATELET # BLD AUTO: 170 K/UL
RBC # BLD: 4.83 M/UL
RBC # FLD: 14 %
TIBC SERPL-MCNC: 346 UG/DL
UIBC SERPL-MCNC: 240 UG/DL
WBC # FLD AUTO: 5.69 K/UL

## 2019-11-01 ENCOUNTER — FORM ENCOUNTER (OUTPATIENT)
Age: 79
End: 2019-11-01

## 2019-11-02 ENCOUNTER — APPOINTMENT (OUTPATIENT)
Dept: CT IMAGING | Facility: CLINIC | Age: 79
End: 2019-11-02
Payer: MEDICARE

## 2019-11-02 ENCOUNTER — OUTPATIENT (OUTPATIENT)
Dept: OUTPATIENT SERVICES | Facility: HOSPITAL | Age: 79
LOS: 1 days | End: 2019-11-02
Payer: COMMERCIAL

## 2019-11-02 DIAGNOSIS — Z98.890 OTHER SPECIFIED POSTPROCEDURAL STATES: Chronic | ICD-10-CM

## 2019-11-02 DIAGNOSIS — R10.30 LOWER ABDOMINAL PAIN, UNSPECIFIED: ICD-10-CM

## 2019-11-02 DIAGNOSIS — Z90.89 ACQUIRED ABSENCE OF OTHER ORGANS: Chronic | ICD-10-CM

## 2019-11-02 DIAGNOSIS — Z90.49 ACQUIRED ABSENCE OF OTHER SPECIFIED PARTS OF DIGESTIVE TRACT: Chronic | ICD-10-CM

## 2019-11-02 PROCEDURE — 74177 CT ABD & PELVIS W/CONTRAST: CPT

## 2019-11-02 PROCEDURE — 82565 ASSAY OF CREATININE: CPT

## 2019-11-02 PROCEDURE — 74177 CT ABD & PELVIS W/CONTRAST: CPT | Mod: 26

## 2019-11-04 ENCOUNTER — OTHER (OUTPATIENT)
Age: 79
End: 2019-11-04

## 2019-11-08 ENCOUNTER — APPOINTMENT (OUTPATIENT)
Dept: UROGYNECOLOGY | Facility: CLINIC | Age: 79
End: 2019-11-08
Payer: MEDICARE

## 2019-11-08 VITALS
DIASTOLIC BLOOD PRESSURE: 78 MMHG | BODY MASS INDEX: 25.61 KG/M2 | HEIGHT: 58 IN | WEIGHT: 122 LBS | SYSTOLIC BLOOD PRESSURE: 130 MMHG

## 2019-11-08 DIAGNOSIS — N81.9 FEMALE GENITAL PROLAPSE, UNSPECIFIED: ICD-10-CM

## 2019-11-08 DIAGNOSIS — R32 UNSPECIFIED URINARY INCONTINENCE: ICD-10-CM

## 2019-11-08 DIAGNOSIS — K57.90 DIVERTICULOSIS OF INTESTINE, PART UNSPECIFIED, W/OUT PERFORATION OR ABSCESS W/OUT BLEEDING: ICD-10-CM

## 2019-11-08 DIAGNOSIS — K59.00 CONSTIPATION, UNSPECIFIED: ICD-10-CM

## 2019-11-08 DIAGNOSIS — N95.2 POSTMENOPAUSAL ATROPHIC VAGINITIS: ICD-10-CM

## 2019-11-08 DIAGNOSIS — Z86.73 PERSONAL HISTORY OF TRANSIENT ISCHEMIC ATTACK (TIA), AND CEREBRAL INFARCTION W/OUT RESIDUAL DEFICITS: ICD-10-CM

## 2019-11-08 DIAGNOSIS — Z86.79 PERSONAL HISTORY OF OTHER DISEASES OF THE CIRCULATORY SYSTEM: ICD-10-CM

## 2019-11-08 DIAGNOSIS — Z86.018 PERSONAL HISTORY OF OTHER BENIGN NEOPLASM: ICD-10-CM

## 2019-11-08 LAB
BILIRUB UR QL STRIP: NEGATIVE
CLARITY UR: CLEAR
COLLECTION METHOD: NORMAL
GLUCOSE UR-MCNC: NEGATIVE
HCG UR QL: 0.2 EU/DL
HGB UR QL STRIP.AUTO: NEGATIVE
KETONES UR-MCNC: NEGATIVE
LEUKOCYTE ESTERASE UR QL STRIP: NEGATIVE
NITRITE UR QL STRIP: NEGATIVE
PH UR STRIP: 5.5
PROT UR STRIP-MCNC: NEGATIVE
SP GR UR STRIP: 1.01

## 2019-11-08 PROCEDURE — 99204 OFFICE O/P NEW MOD 45 MIN: CPT | Mod: 25

## 2019-11-08 PROCEDURE — 51701 INSERT BLADDER CATHETER: CPT

## 2019-11-08 PROCEDURE — 81003 URINALYSIS AUTO W/O SCOPE: CPT | Mod: QW

## 2019-11-08 NOTE — PHYSICAL EXAM
[No Acute Distress] : in no acute distress [Oriented x3] : oriented to person, place, and time [Symmetrical] : the neck was ~L symmetrical [No Edema] : ~T edema was not present [None] : no CVA tenderness [Soft, Nontender] : the abdomen was soft and nontender [Warm and Dry] : was warm and dry to touch [Normal Gait] : gait was normal [Labia Minora] : were normal [Labia Majora] : were normal [Bartholin's Gland] : both Bartholin's glands were normal  [Atrophy] : atrophy [No Bleeding] : there was no active vaginal bleeding [Aa ____] : Aa [unfilled] [2] : 2 [Ba ____] : Ba [unfilled] [C ____] : C [unfilled] [GH ____] : GH [unfilled] [PB ____] : PB [unfilled] [TVL ____] : TVL  [unfilled] [Ap ____] : Ap [unfilled] [Bp ____] : Bp [unfilled] [D ____] : D [unfilled] [] : I [Normal] : normal [Soft] :  the cervix was soft [Post Void Residual ____ml] : post void residual via catheterization was [unfilled] ml [Exam Deferred] : was deferred [Distended] : not distended [Cough] : no cough [Tenderness] : ~T no ~M abdominal tenderness observed [Inguinal LAD] : no adenopathy was noted in the inguinal lymph nodes [FreeTextEntry3] : +urethral hypermobility, CST neg [de-identified] : no appreciable masses

## 2019-11-08 NOTE — ASSESSMENT
[FreeTextEntry1] : Yane is a pleasant 80 yo P3, PSHx includes appendectomy, presents with years of bothersome pelvic organ prolapse and rare OAB symtpoms. On exam, her empty supine CST was neg, however she had positive urethral hypermobility. Her straight-cath PVR volume was 80 ml and the dip was neg. On pelvic exam, there were no appreciable masses or lesions. Pelvic floor muscle contraction strength was present but weak. POPQ exam showed stage III POP with primarily anterior vaginal wall prolapse, and apical descent as well.\par \par The patient has pelvic organ prolapse. Management options including observation, kegels with or without PT, biofeedback, pessary, and surgery were reviewed. Pessary care reviewed. Surg options reviewed - abd / robotic RICKY / ASC, hysteropexy; vag SSLF hysteropexy, TVH / USLS; or anterior colporrhaphy alone. R/B/A to each, efficacies discussed. Risk of progression if untreated discussed. Risks of surgical length depending on procedures, bleeding in setting of meds, comorbidities discussed in detail. Her husabnd was present throughout the discussion and they asked many questions. She has decided on initial pessary fitting, then reassess symptoms as well as affects on urinary symptoms. She should avoid constipation with miralax and colace, and followup with CRS considering her complaints - referral info provided. She is aware that if unsatisfactory with pesasry care, she can opt for surgical intervention thereafter, and we can readdress R/B/A to options. All questions answered. \par \par Plan:\par [] initial pessary fitting, anticipate trying ring with support #4? as it seems she would like to try to perform self-care

## 2019-11-08 NOTE — CHIEF COMPLAINT
[Questionnaire Received] : Patient questionnaire received [Falling Pelvic Organs] : falling pelvic organs [Poor Bladder Control] : poor bladder control [Urine Frequency] : urine frequency

## 2019-11-08 NOTE — PROCEDURE
[Straight Catheterization] : insertion of a straight catheter [Urgent Incontinence] : urgent incontinence [Urinary Retention] : urinary retention [Urinary Frequency] : urinary frequency [Patient] : the patient [___ Fr Straight Tip] : a [unfilled] in [place] straight tip catheter [Clear] : clear [None] : there were no complications with the catheter insertion [No Complications] : no complications [Post procedure instructions and information given] : Post procedure instructions and information were given and reviewed with patient. [Tolerated Well] : the patient tolerated the procedure well [1] : 1 [FreeTextEntry1] : catheterized to obtain PVR and uncontaminatd specimen

## 2019-11-08 NOTE — HISTORY OF PRESENT ILLNESS
[FreeTextEntry1] : Years of worsening bothersome bulge and pressure in vagina, awareness is bothersome. No incomplete bladder emptying subjectively, no gross hematuria, no dysuria, no UTIs, no flank pain. Rare urge leakage if full, small drop, no pads used; nocturia ? 1 time. No leakage with cough sneeze, no daytime frequency. Occasionally slow flow of urination or positional changes affect stream. No vaginal bleeding, externally sexually active, no intercourse, with . Normal bowel movements without trapping, occasional new FI of small "pelet-like" balls, no blood per rectum, no pain. Reports GI followup including recent colonoscopy and hospitalization, ruled-out for cancer. No intervention as of yet for prolapse. Cardiac history, afib, went off her meds then suffered ischemic stroke 12/18. Currently on Pradaxa.

## 2019-11-08 NOTE — OB HISTORY
[Sexually Active] : sexually active [Vaginal ___] : [unfilled] vaginal delivery(s) [FreeTextEntry1] : largest baby 8 lbs\par \par sexual activity external, no intercourse

## 2019-12-27 ENCOUNTER — APPOINTMENT (OUTPATIENT)
Dept: UROGYNECOLOGY | Facility: CLINIC | Age: 79
End: 2019-12-27

## 2020-01-03 ENCOUNTER — APPOINTMENT (OUTPATIENT)
Dept: NEUROLOGY | Facility: CLINIC | Age: 80
End: 2020-01-03

## 2020-01-11 LAB — HEMOCCULT STL QL IA: POSITIVE

## 2020-01-28 LAB
ALBUMIN SERPL ELPH-MCNC: 4.5 G/DL
ALP BLD-CCNC: 138 U/L
ALT SERPL-CCNC: 15 U/L
ANION GAP SERPL CALC-SCNC: 11 MMOL/L
APPEARANCE: CLEAR
AST SERPL-CCNC: 20 U/L
BACTERIA: NEGATIVE
BASOPHILS # BLD AUTO: 0.03 K/UL
BASOPHILS NFR BLD AUTO: 0.5 %
BILIRUB SERPL-MCNC: 0.4 MG/DL
BILIRUBIN URINE: NEGATIVE
BLOOD URINE: ABNORMAL
BUN SERPL-MCNC: 22 MG/DL
CALCIUM SERPL-MCNC: 9.5 MG/DL
CHLORIDE SERPL-SCNC: 102 MMOL/L
CHOLEST SERPL-MCNC: 137 MG/DL
CHOLEST/HDLC SERPL: 1.9 RATIO
CO2 SERPL-SCNC: 28 MMOL/L
COLOR: YELLOW
CREAT SERPL-MCNC: 0.92 MG/DL
EOSINOPHIL # BLD AUTO: 0.04 K/UL
EOSINOPHIL NFR BLD AUTO: 0.7 %
ESTIMATED AVERAGE GLUCOSE: 117 MG/DL
FOLATE SERPL-MCNC: 14.1 NG/ML
GLUCOSE QUALITATIVE U: NEGATIVE
GLUCOSE SERPL-MCNC: 97 MG/DL
HBA1C MFR BLD HPLC: 5.7 %
HCT VFR BLD CALC: 43.2 %
HDLC SERPL-MCNC: 71 MG/DL
HGB BLD-MCNC: 13.7 G/DL
HYALINE CASTS: 4 /LPF
IMM GRANULOCYTES NFR BLD AUTO: 0.3 %
KETONES URINE: NEGATIVE
LDLC SERPL CALC-MCNC: 54 MG/DL
LEUKOCYTE ESTERASE URINE: ABNORMAL
LYMPHOCYTES # BLD AUTO: 1.41 K/UL
LYMPHOCYTES NFR BLD AUTO: 23.2 %
MAN DIFF?: NORMAL
MCHC RBC-ENTMCNC: 29.4 PG
MCHC RBC-ENTMCNC: 31.7 GM/DL
MCV RBC AUTO: 92.7 FL
MICROSCOPIC-UA: NORMAL
MONOCYTES # BLD AUTO: 0.5 K/UL
MONOCYTES NFR BLD AUTO: 8.2 %
NEUTROPHILS # BLD AUTO: 4.08 K/UL
NEUTROPHILS NFR BLD AUTO: 67.1 %
NITRITE URINE: NEGATIVE
PH URINE: 5.5
PLATELET # BLD AUTO: 179 K/UL
POTASSIUM SERPL-SCNC: 4.1 MMOL/L
PROT SERPL-MCNC: 7.4 G/DL
PROTEIN URINE: NORMAL
RBC # BLD: 4.66 M/UL
RBC # FLD: 13.8 %
RED BLOOD CELLS URINE: 2 /HPF
SODIUM SERPL-SCNC: 142 MMOL/L
SPECIFIC GRAVITY URINE: 1.03
SQUAMOUS EPITHELIAL CELLS: 4 /HPF
T3FREE SERPL-MCNC: 2.81 PG/ML
T4 FREE SERPL-MCNC: 1 NG/DL
TRIGL SERPL-MCNC: 58 MG/DL
TSH SERPL-ACNC: 4.12 UIU/ML
UROBILINOGEN URINE: NORMAL
VIT B12 SERPL-MCNC: 480 PG/ML
WBC # FLD AUTO: 6.08 K/UL
WHITE BLOOD CELLS URINE: 4 /HPF

## 2020-01-29 LAB — ALP BLD-CCNC: 137 U/L

## 2020-02-01 LAB
ALKPISO INTERP: NORMAL
ALP BLD-CCNC: 131 U/L
ALP BONE CFR SERPL: 61 %
ALP INTEST CFR SERPL: 6 %
ALP LIVER CFR SERPL: 33 %
ALP MACRO CFR SERPL: 0 %
ALP PLAC CFR SERPL: 0 %

## 2020-02-20 NOTE — H&P ADULT - NSCORESITESY/N_GEN_A_CORE_RD
Report called to Julisa Potts on [address]. Pt will be transported shortly.      Garland Forrester RN  02/20/20 0269 Yes

## 2020-03-05 ENCOUNTER — APPOINTMENT (OUTPATIENT)
Dept: INTERNAL MEDICINE | Facility: CLINIC | Age: 80
End: 2020-03-05
Payer: MEDICARE

## 2020-03-05 VITALS
HEART RATE: 66 BPM | DIASTOLIC BLOOD PRESSURE: 76 MMHG | SYSTOLIC BLOOD PRESSURE: 112 MMHG | BODY MASS INDEX: 26.24 KG/M2 | OXYGEN SATURATION: 96 % | RESPIRATION RATE: 14 BRPM | TEMPERATURE: 98.7 F | WEIGHT: 125 LBS | HEIGHT: 58 IN

## 2020-03-05 VITALS — SYSTOLIC BLOOD PRESSURE: 112 MMHG | DIASTOLIC BLOOD PRESSURE: 70 MMHG

## 2020-03-05 PROCEDURE — 99213 OFFICE O/P EST LOW 20 MIN: CPT

## 2020-03-05 NOTE — HISTORY OF PRESENT ILLNESS
[FreeTextEntry1] : BP check [de-identified] : Pt saw her cardiologist this morning and her BP was 148/82. Dr George wants her BP to be better controlled. After she left the cardiology office, she went out to lunch, worked on the treadmill, was sitting in the car listening to music and relaxing for 1/2 prior to coming into this office because she was early. She is here to have her BP rechecked.

## 2020-09-11 ENCOUNTER — APPOINTMENT (OUTPATIENT)
Dept: INTERNAL MEDICINE | Facility: CLINIC | Age: 80
End: 2020-09-11
Payer: MEDICARE

## 2020-09-11 VITALS
HEIGHT: 58 IN | BODY MASS INDEX: 25.19 KG/M2 | DIASTOLIC BLOOD PRESSURE: 70 MMHG | RESPIRATION RATE: 14 BRPM | SYSTOLIC BLOOD PRESSURE: 110 MMHG | HEART RATE: 76 BPM | OXYGEN SATURATION: 98 % | WEIGHT: 120 LBS | TEMPERATURE: 97.5 F

## 2020-09-11 PROCEDURE — 99214 OFFICE O/P EST MOD 30 MIN: CPT

## 2020-09-11 NOTE — HISTORY OF PRESENT ILLNESS
[FreeTextEntry1] : h/o CVA and Grave's disease [de-identified] : h/o Grave's disease and needs a new endocrinologist. \par \par She had a CVA 12/2018. Pt is a little unsteady at times and feels like she is walking on water. Has some swelling in her feet. \par \par h/o A fib and sees Dr George.

## 2020-09-12 LAB
ALBUMIN SERPL ELPH-MCNC: 4.5 G/DL
ALP BLD-CCNC: 116 U/L
ALT SERPL-CCNC: 12 U/L
ANION GAP SERPL CALC-SCNC: 14 MMOL/L
AST SERPL-CCNC: 21 U/L
BASOPHILS # BLD AUTO: 0.04 K/UL
BASOPHILS NFR BLD AUTO: 0.6 %
BILIRUB SERPL-MCNC: 0.7 MG/DL
BUN SERPL-MCNC: 15 MG/DL
CALCIUM SERPL-MCNC: 9.4 MG/DL
CHLORIDE SERPL-SCNC: 103 MMOL/L
CHOLEST SERPL-MCNC: 129 MG/DL
CHOLEST/HDLC SERPL: 1.9 RATIO
CO2 SERPL-SCNC: 26 MMOL/L
CREAT SERPL-MCNC: 0.84 MG/DL
EOSINOPHIL # BLD AUTO: 0.05 K/UL
EOSINOPHIL NFR BLD AUTO: 0.8 %
ESTIMATED AVERAGE GLUCOSE: 117 MG/DL
FOLATE SERPL-MCNC: 13.3 NG/ML
GLUCOSE SERPL-MCNC: 85 MG/DL
HBA1C MFR BLD HPLC: 5.7 %
HCT VFR BLD CALC: 44.6 %
HDLC SERPL-MCNC: 70 MG/DL
HGB BLD-MCNC: 13.5 G/DL
IMM GRANULOCYTES NFR BLD AUTO: 0.2 %
LDLC SERPL CALC-MCNC: 47 MG/DL
LYMPHOCYTES # BLD AUTO: 1.49 K/UL
LYMPHOCYTES NFR BLD AUTO: 23.2 %
MAN DIFF?: NORMAL
MCHC RBC-ENTMCNC: 28.8 PG
MCHC RBC-ENTMCNC: 30.3 GM/DL
MCV RBC AUTO: 95.3 FL
MONOCYTES # BLD AUTO: 0.61 K/UL
MONOCYTES NFR BLD AUTO: 9.5 %
NEUTROPHILS # BLD AUTO: 4.21 K/UL
NEUTROPHILS NFR BLD AUTO: 65.7 %
PLATELET # BLD AUTO: 169 K/UL
POTASSIUM SERPL-SCNC: 4.2 MMOL/L
PROT SERPL-MCNC: 7.3 G/DL
RBC # BLD: 4.68 M/UL
RBC # FLD: 14.3 %
SODIUM SERPL-SCNC: 142 MMOL/L
T3FREE SERPL-MCNC: 2.75 PG/ML
T4 FREE SERPL-MCNC: 1 NG/DL
TRIGL SERPL-MCNC: 63 MG/DL
TSH SERPL-ACNC: 3.68 UIU/ML
VIT B12 SERPL-MCNC: 541 PG/ML
WBC # FLD AUTO: 6.41 K/UL

## 2020-09-28 ENCOUNTER — RX RENEWAL (OUTPATIENT)
Age: 80
End: 2020-09-28

## 2020-10-04 ENCOUNTER — TRANSCRIPTION ENCOUNTER (OUTPATIENT)
Age: 80
End: 2020-10-04

## 2020-10-05 ENCOUNTER — APPOINTMENT (OUTPATIENT)
Dept: ENDOCRINOLOGY | Facility: CLINIC | Age: 80
End: 2020-10-05
Payer: MEDICARE

## 2020-10-05 VITALS
WEIGHT: 124 LBS | BODY MASS INDEX: 26.03 KG/M2 | SYSTOLIC BLOOD PRESSURE: 160 MMHG | HEART RATE: 61 BPM | OXYGEN SATURATION: 95 % | HEIGHT: 58 IN | DIASTOLIC BLOOD PRESSURE: 86 MMHG

## 2020-10-05 PROCEDURE — 99203 OFFICE O/P NEW LOW 30 MIN: CPT

## 2020-10-05 NOTE — CONSULT LETTER
[Dear  ___] : Dear  [unfilled], [Consult Letter:] : I had the pleasure of evaluating your patient, [unfilled]. [Please see my note below.] : Please see my note below. [Consult Closing:] : Thank you very much for allowing me to participate in the care of this patient.  If you have any questions, please do not hesitate to contact me. [Sincerely,] : Sincerely, [FreeTextEntry3] : Stephanie Mabry MS. DO.\par Endocrinology, Diabetes and Metabolism\par [address] [place] [address]\[place] [address]\par Tel [phone]\par Fax [phone]\par

## 2020-10-05 NOTE — HISTORY OF PRESENT ILLNESS
[FreeTextEntry1] : Ms. DANN CURRY is a 80 year old female  with a past medical history of Graves Disease, afib, HTN, CVA who presents for management of her thyroid disease. Patient was first diagnosed with thyroid disorder in 1998. She takes methimazole 5 mg QD. She was following with an endocrinologist and is changing due to insurance. She is adopted and does not know family history. She denies any exposure to radiation. She feels well and denies any weight changes, diarrhea, constipation, sob, tremors, anxiety, depression, temperature intolerance. \par \par \par

## 2020-10-05 NOTE — ASSESSMENT
[FreeTextEntry1] : 80 year old female  with a past medical history of Graves Disease, afib, HTN, CVA who presents for management of her thyroid disease.\par \par 1. Graves Disease\par Controlled on Methimazole 5 mg QD\par Discussed risks of Methimazole. Elevated LFTs, agranulocytosis, neutropenia. To all with any fever or infection.\par Will repeat labs, thyroid US and DEXA before next follow-up\par Scripts provided\par

## 2020-10-13 NOTE — ED ADULT NURSE NOTE - CAS DISCH ACCOMP BY
[FreeTextEntry1] : Patient here in follow up to last visit and prior issue\par 
[de-identified] : Patient is here followup of her multiple medical problems. She had a stent many years ago but is still on Plavix. She recently had a nodular density that on repeat CAT scan had resolved and was felt secondary to reflux.\par Patient is not complaining of chest pain shortness of breath. She has finally stopped smoking and feels better. She is compliant with her medication
denies pain/discomfort
Transporter

## 2020-12-16 PROBLEM — Z12.11 ENCOUNTER FOR SCREENING COLONOSCOPY: Status: RESOLVED | Noted: 2018-06-01 | Resolved: 2020-12-16

## 2020-12-21 ENCOUNTER — RX RENEWAL (OUTPATIENT)
Age: 80
End: 2020-12-21

## 2020-12-21 PROBLEM — Z87.09 HISTORY OF SORE THROAT: Status: RESOLVED | Noted: 2017-06-05 | Resolved: 2020-12-21

## 2021-02-01 NOTE — ED PROVIDER NOTE - CPE EDP SKIN NORM
eMERGENCY dEPARTMENT eNCOUnter      PCP: Peg Lim MD    [address] [facility]    MI (myocardial infarction) ([facility] [place] [address]     PVD (peripheral vascular disease) ([facility] [place] [address]      NABIL Madrid  02/01/21 2086 normal...

## 2021-02-25 ENCOUNTER — RX RENEWAL (OUTPATIENT)
Age: 81
End: 2021-02-25

## 2021-03-22 ENCOUNTER — RX RENEWAL (OUTPATIENT)
Age: 81
End: 2021-03-22

## 2021-03-24 ENCOUNTER — APPOINTMENT (OUTPATIENT)
Dept: ENDOCRINOLOGY | Facility: CLINIC | Age: 81
End: 2021-03-24
Payer: MEDICARE

## 2021-03-24 VITALS
BODY MASS INDEX: 25.82 KG/M2 | HEART RATE: 72 BPM | DIASTOLIC BLOOD PRESSURE: 82 MMHG | WEIGHT: 123 LBS | OXYGEN SATURATION: 96 % | HEIGHT: 58 IN | SYSTOLIC BLOOD PRESSURE: 125 MMHG

## 2021-03-24 PROCEDURE — 99072 ADDL SUPL MATRL&STAF TM PHE: CPT

## 2021-03-24 PROCEDURE — 36415 COLL VENOUS BLD VENIPUNCTURE: CPT

## 2021-03-24 PROCEDURE — 99214 OFFICE O/P EST MOD 30 MIN: CPT | Mod: 25

## 2021-03-24 NOTE — HISTORY OF PRESENT ILLNESS
[FreeTextEntry1] : Ms. DANN CURRY is a 80 year old female  with a past medical history of Graves Disease, afib, HTN, CVA who presents for management of her thyroid disease. Patient lost her  last month and is still mourning. Otherwise she feels ok . No new symptoms.\par \par \par Initial History:\par Patient was first diagnosed with thyroid disorder in 1998. She takes methimazole 5 mg QD. She was following with an endocrinologist and is changing due to insurance. She is adopted and does not know family history. She denies any exposure to radiation. She feels well and denies any weight changes, diarrhea, constipation, sob, tremors, anxiety, depression, temperature intolerance. \par \par \par

## 2021-03-24 NOTE — ASSESSMENT
[FreeTextEntry1] : 80 year old female  with a past medical history of Graves Disease, afib, HTN, CVA who presents for management of her thyroid disease.\par \par 1. Graves Disease\par Controlled on Methimazole 5 mg QD\par Discussed risks of Methimazole. Elevated LFTs, agranulocytosis, neutropenia. To call with any fever or infection.\par Will repeat labs, thyroid US and DEXA \par May plan to slowly titrate off Methimazole based on levels\par

## 2021-04-01 ENCOUNTER — APPOINTMENT (OUTPATIENT)
Dept: RADIOLOGY | Facility: CLINIC | Age: 81
End: 2021-04-01
Payer: MEDICARE

## 2021-04-01 ENCOUNTER — OUTPATIENT (OUTPATIENT)
Dept: OUTPATIENT SERVICES | Facility: HOSPITAL | Age: 81
LOS: 1 days | End: 2021-04-01
Payer: COMMERCIAL

## 2021-04-01 ENCOUNTER — APPOINTMENT (OUTPATIENT)
Dept: ULTRASOUND IMAGING | Facility: CLINIC | Age: 81
End: 2021-04-01
Payer: MEDICARE

## 2021-04-01 DIAGNOSIS — Z98.890 OTHER SPECIFIED POSTPROCEDURAL STATES: Chronic | ICD-10-CM

## 2021-04-01 DIAGNOSIS — Z90.89 ACQUIRED ABSENCE OF OTHER ORGANS: Chronic | ICD-10-CM

## 2021-04-01 DIAGNOSIS — E05.90 THYROTOXICOSIS, UNSPECIFIED WITHOUT THYROTOXIC CRISIS OR STORM: ICD-10-CM

## 2021-04-01 DIAGNOSIS — Z90.49 ACQUIRED ABSENCE OF OTHER SPECIFIED PARTS OF DIGESTIVE TRACT: Chronic | ICD-10-CM

## 2021-04-01 PROCEDURE — 77080 DXA BONE DENSITY AXIAL: CPT | Mod: 26

## 2021-04-01 PROCEDURE — 77080 DXA BONE DENSITY AXIAL: CPT

## 2021-04-01 PROCEDURE — 76536 US EXAM OF HEAD AND NECK: CPT | Mod: 26

## 2021-04-01 PROCEDURE — 76536 US EXAM OF HEAD AND NECK: CPT

## 2021-04-08 LAB
T3 SERPL-MCNC: 115 NG/DL
T4 FREE SERPL-MCNC: 1.2 NG/DL
THYROGLOB AB SERPL-ACNC: <20 IU/ML
THYROGLOB SERPL-MCNC: 374 NG/ML
THYROPEROXIDASE AB SERPL IA-ACNC: 10.9 IU/ML
TSH RECEPTOR AB: 4.37 IU/L
TSH SERPL-ACNC: 2.97 UIU/ML
TSI ACT/NOR SER: 4.21 IU/L

## 2021-05-29 ENCOUNTER — EMERGENCY (EMERGENCY)
Facility: HOSPITAL | Age: 81
LOS: 1 days | Discharge: HOME | End: 2021-05-29
Attending: EMERGENCY MEDICINE | Admitting: EMERGENCY MEDICINE
Payer: COMMERCIAL

## 2021-05-29 VITALS
HEART RATE: 66 BPM | TEMPERATURE: 98 F | OXYGEN SATURATION: 98 % | DIASTOLIC BLOOD PRESSURE: 75 MMHG | RESPIRATION RATE: 16 BRPM | SYSTOLIC BLOOD PRESSURE: 130 MMHG

## 2021-05-29 VITALS
WEIGHT: 125 LBS | SYSTOLIC BLOOD PRESSURE: 149 MMHG | HEIGHT: 58 IN | DIASTOLIC BLOOD PRESSURE: 87 MMHG | TEMPERATURE: 98 F | HEART RATE: 72 BPM | OXYGEN SATURATION: 95 % | RESPIRATION RATE: 16 BRPM

## 2021-05-29 DIAGNOSIS — Z90.89 ACQUIRED ABSENCE OF OTHER ORGANS: Chronic | ICD-10-CM

## 2021-05-29 DIAGNOSIS — Z90.49 ACQUIRED ABSENCE OF OTHER SPECIFIED PARTS OF DIGESTIVE TRACT: Chronic | ICD-10-CM

## 2021-05-29 DIAGNOSIS — Z98.890 OTHER SPECIFIED POSTPROCEDURAL STATES: Chronic | ICD-10-CM

## 2021-05-29 PROCEDURE — 12002 RPR S/N/AX/GEN/TRNK2.6-7.5CM: CPT

## 2021-05-29 PROCEDURE — 70450 CT HEAD/BRAIN W/O DYE: CPT

## 2021-05-29 PROCEDURE — 72125 CT NECK SPINE W/O DYE: CPT | Mod: 26

## 2021-05-29 PROCEDURE — 72125 CT NECK SPINE W/O DYE: CPT

## 2021-05-29 PROCEDURE — 99284 EMERGENCY DEPT VISIT MOD MDM: CPT | Mod: 25

## 2021-05-29 PROCEDURE — 99285 EMERGENCY DEPT VISIT HI MDM: CPT | Mod: 25

## 2021-05-29 PROCEDURE — 70450 CT HEAD/BRAIN W/O DYE: CPT | Mod: 26

## 2021-05-29 RX ORDER — ACETAMINOPHEN 500 MG
650 TABLET ORAL ONCE
Refills: 0 | Status: COMPLETED | OUTPATIENT
Start: 2021-05-29 | End: 2021-05-29

## 2021-05-29 RX ADMIN — Medication 650 MILLIGRAM(S): at 12:57

## 2021-05-29 NOTE — ED PROVIDER NOTE - CARE PLAN
Principal Discharge DX:	Injury of head, initial encounter   Principal Discharge DX:	Injury of head, initial encounter  Secondary Diagnosis:	Scalp laceration, initial encounter

## 2021-05-29 NOTE — ED PROVIDER NOTE - PATIENT PORTAL LINK FT
You can access the FollowMyHealth Patient Portal offered by [facility] by registering at the following website: [email or web address]. By joining [facility]’s FollowMyHealth portal, you will also be able to view your health information using other applications (apps) compatible with our system.

## 2021-05-29 NOTE — ED PROVIDER NOTE - NSFOLLOWUPINSTRUCTIONS_ED_ALL_ED_FT
do not wet scalp for 24 hours  return in 7 days for staple removal  follow up with pcp  return to er for any worsening symptoms    Head Injury    WHAT YOU NEED TO KNOW:    A head injury can include your scalp, face, skull, or brain and range from mild to severe. Effects can appear immediately after the injury or develop later. The effects may last a short time or be permanent. Healthcare providers may want to check your recovery over time. Treatment may change as you recover or develop new health problems from the head injury.    DISCHARGE INSTRUCTIONS:    Call your local emergency number (911 in the [place]), or have someone else call if:   •You cannot be woken.      •You have a seizure.      •You stop responding to others or you faint.      •You have blurry or double vision.      •Your speech becomes slurred or confused.      •You have arm or leg weakness, loss of feeling, or new problems with coordination.      •Your pupils are larger than usual, or one pupil is a different size than the other.      •You have blood or clear fluid coming out of your ears or nose.      Seek care immediately if:   •You have repeated or forceful vomiting.      •You feel confused.      •Your headache gets worse or becomes severe.      •You or someone caring for you notices that you are harder to wake than usual.      Call your doctor if:   •Your symptoms last longer than 6 weeks after the injury.      •You have questions or concerns about your condition or care.      Medicines:   •Acetaminophen decreases pain and fever. It is available without a doctor's order. Ask how much to take and how often to take it. Follow directions. Read the labels of all other medicines you are using to see if they also contain acetaminophen, or ask your doctor or pharmacist. Acetaminophen can cause liver damage if not taken correctly. Do not use more than 4 grams (4,000 milligrams) total of acetaminophen in one day.       •Take your medicine as directed. Contact your healthcare provider if you think your medicine is not helping or if you have side effects. Tell him or her if you are allergic to any medicine. Keep a list of the medicines, vitamins, and herbs you take. Include the amounts, and when and why you take them. Bring the list or the pill bottles to follow-up visits. Carry your medicine list with you in case of an emergency.      Self-care:   •Rest or do quiet activities. Limit your time watching TV, using the computer, or doing tasks that require a lot of thinking. Slowly return to your normal activities as directed. Do not play sports or do activities that may cause you to get hit in the head. Ask your healthcare provider when you can return to sports.      •Apply ice on your head for 15 to 20 minutes every hour or as directed. Use an ice pack, or put crushed ice in a plastic bag. Cover it with a towel before you apply it to your skin. Ice helps prevent tissue damage and decreases swelling and pain.      •Have someone stay with you for 24 hours , or as directed. This person can monitor you for problems and call for help if needed. When you are awake, the person should ask you a few questions every few hours to see if you are thinking clearly. An example is to ask your name or address.      Prevent another head injury:   •Wear a helmet that fits properly. Do this when you play sports, or ride a bike, scooter, or skateboard. Helmets help decrease your risk for a serious head injury. Talk to your healthcare provider about other ways you can protect yourself if you play sports.      •Wear your seatbelt every time you are in a car. This helps lower your risk for a head injury if you are in a car accident.      Follow up with your doctor as directed: Write down your questions so you remember to ask them during your visits.

## 2021-05-29 NOTE — ED PROVIDER NOTE - CONSTITUTIONAL, MLM
normal... Well appearing, awake, alert, oriented to person, place, time/situation and in no apparent distress. 2.5 cm laceration on scalp

## 2021-05-29 NOTE — ED PROVIDER NOTE - PROGRESS NOTE DETAILS
At length discussion with patient in regards to the head inj.  Discussed importance of prompt, close medical follow-up.  Discussed importance of avoiding activities where the patient would be at risk for further head injury, for a minimum of 2 weeks.  Discussed head injury precautions and instructions as well.  Patient demonstrates understanding of all instructions.   Dw pt re wound care inst / prec, need for sr and to return with any changes / concerns.

## 2021-05-29 NOTE — ED ADULT NURSE NOTE - NSIMPLEMENTINTERV_GEN_ALL_ED
Implemented All Fall with Harm Risk Interventions:  [place] to call system. Call bell, personal items and telephone within reach. Instruct patient to call for assistance. Room bathroom lighting operational. Non-slip footwear when patient is off stretcher. Physically safe environment: no spills, clutter or unnecessary equipment. Stretcher in lowest position, wheels locked, appropriate side rails in place. Provide visual cue, wrist band, yellow gown, etc. Monitor gait and stability. Monitor for mental status changes and reorient to person, place, and time. Review medications for side effects contributing to fall risk. Reinforce activity limits and safety measures with patient and family. Provide visual clues: red socks.

## 2021-05-29 NOTE — ED ADULT NURSE NOTE - OBJECTIVE STATEMENT
Patient is a 82yo female presenting to [facility] s/p a fall backwards in her kitchen. Patient denies loss of consciousness but stated that she was dizzy after hitting her head. Patient has 1.25 inch laceration bleeding minimally on the occipital head. Patient states that she takes blood thinning medication due to Past medical history of stroke

## 2021-05-29 NOTE — ED PROVIDER NOTE - ATTENDING CONTRIBUTION TO CARE
80 yo F p/w [facility] fall today - slip and fall, hit back of head. no loc. Pt co lac to scalp, mild HA. Occ slight dizziness, now resolved. No dizzy prior. No cp/sob/palp. no neck / back pain. pt fully vaccinated to covid, no recent exposures. no agg/allev factors., no other inj or co.  exam: MM Moist. non-toxic, well appearing. Scalp lac , occiput, no crepitus. no spinal tend (c,t,l). nl non-focal neuro exam. NO other acute findings.  check CT,lac repair, outpt fu.  At length discussion with patient in regards to the head inj.  Discussed importance of prompt, close medical follow-up.  Discussed importance of avoiding activities where the patient would be at risk for further head injury, for a minimum of 2 weeks.  Discussed head injury precautions and instructions as well.  Patient demonstrates understanding of all instructions.

## 2021-05-29 NOTE — ED PROVIDER NOTE - ENMT, MLM
Airway patent, Nasal mucosa clear. Mouth with normal mucosa. Throat has no vesicles, no oropharyngeal exudates and uvula is midline.
Specific interventions were implemented:

## 2021-05-29 NOTE — ED PROVIDER NOTE - OBJECTIVE STATEMENT
Pt is a 82 yo female with pmhx of Afib on pradaxa cva HTN Overactive thyroid gland brought in by daughter s/p slip and fall while in kitchen hit back of head no dizziness states she felt dizzy spinning sensation after fall resolved no no dizziness chest pain sob nausea neck pain chest pain sob fever chills. Tdap is up to date.

## 2021-06-07 ENCOUNTER — APPOINTMENT (OUTPATIENT)
Dept: INTERNAL MEDICINE | Facility: CLINIC | Age: 81
End: 2021-06-07
Payer: MEDICARE

## 2021-06-07 VITALS
DIASTOLIC BLOOD PRESSURE: 76 MMHG | BODY MASS INDEX: 25.82 KG/M2 | TEMPERATURE: 97.2 F | WEIGHT: 123 LBS | HEART RATE: 65 BPM | RESPIRATION RATE: 14 BRPM | SYSTOLIC BLOOD PRESSURE: 122 MMHG | HEIGHT: 58 IN | OXYGEN SATURATION: 96 %

## 2021-06-07 DIAGNOSIS — S09.90XA UNSPECIFIED INJURY OF HEAD, INITIAL ENCOUNTER: ICD-10-CM

## 2021-06-07 DIAGNOSIS — K59.09 OTHER CONSTIPATION: ICD-10-CM

## 2021-06-07 PROCEDURE — 99214 OFFICE O/P EST MOD 30 MIN: CPT

## 2021-06-07 RX ORDER — AMLODIPINE BESYLATE 5 MG/1
5 TABLET ORAL DAILY
Qty: 90 | Refills: 1 | Status: DISCONTINUED | COMMUNITY
Start: 2019-04-17 | End: 2021-06-07

## 2021-06-07 NOTE — PHYSICAL EXAM
[Normal] : affect was normal and insight and judgment were intact [de-identified] : 3 posterior scalp staples

## 2021-06-07 NOTE — HISTORY OF PRESENT ILLNESS
[FreeTextEntry1] : head trauma [de-identified] : Pt fell in her kitchen on 5/29, Saturday. She did not have LOC. She went to [facility] and needed 3 staples to back of head. Had ct of head done and c'spine. Feeling fine now. No headache, dizziness. Tetanus shot not given and doesn't want today.

## 2021-06-08 NOTE — DISCHARGE NOTE ADULT - HOSPITAL COURSE
Dermatologist in [place] area # [phone]  Dr. Tyesha Guzmán   
79 yo F pmhx of paroxysmal atrial fibrillation, hyperthyroidism, htn s/p colonoscopy 6/20/18 with 5 polyps removed, presents with c/o constant nonradiating 10/10 lower abd cramping associated w/ fever, dark bloody loose BM, admitted for ascending colitis  sec to post  colonoscopy procedure and polyp removal  .      hosp course  Colitis.  Continue  iv Zosyn  on  full liquid.  blood cult came back  neg   GI (Dr. Carpio) following.   leucocytosis sec to colitis resolved     lower gi bleed Likely due to recent colonoscopy and polyp removal.   No evidence of large volume blood loss.  Continue to monitor H/H  stable    chr Afib.   home metoprolol  not on AC at home per her own preference has been advised she is at elevated risk for stroke by having afib and not being on anticoagulation.  follow up with Cardiology outpt.  mild hypokalemia replaced .    hosp course pt start feeling better pt tolerated full liquid diet well advance to soft as per gi dr varela pt can be dc home if tolerate diet tonight  . fu out pt with in 1wk / cont oral po abx.

## 2021-06-22 ENCOUNTER — RX RENEWAL (OUTPATIENT)
Age: 81
End: 2021-06-22

## 2021-09-20 ENCOUNTER — RX RENEWAL (OUTPATIENT)
Age: 81
End: 2021-09-20

## 2021-10-21 PROBLEM — R42 DIZZINESS AND GIDDINESS: Chronic | Status: ACTIVE | Noted: 2021-05-29

## 2021-10-26 DIAGNOSIS — R74.8 ABNORMAL LEVELS OF OTHER SERUM ENZYMES: ICD-10-CM

## 2021-10-26 DIAGNOSIS — G47.33 OBSTRUCTIVE SLEEP APNEA (ADULT) (PEDIATRIC): ICD-10-CM

## 2021-10-26 DIAGNOSIS — E07.9 DISORDER OF THYROID, UNSPECIFIED: ICD-10-CM

## 2021-10-26 DIAGNOSIS — R15.9 FULL INCONTINENCE OF FECES: ICD-10-CM

## 2021-10-26 DIAGNOSIS — R35.1 NOCTURIA: ICD-10-CM

## 2021-10-26 DIAGNOSIS — R19.5 OTHER FECAL ABNORMALITIES: ICD-10-CM

## 2021-10-26 DIAGNOSIS — R31.29 OTHER MICROSCOPIC HEMATURIA: ICD-10-CM

## 2021-10-27 LAB
25(OH)D3 SERPL-MCNC: 24.6 NG/ML
ALBUMIN SERPL ELPH-MCNC: 4.5 G/DL
ALP BLD-CCNC: 92 U/L
ALT SERPL-CCNC: 12 U/L
ANION GAP SERPL CALC-SCNC: 13 MMOL/L
AST SERPL-CCNC: 18 U/L
BASOPHILS # BLD AUTO: 0.05 K/UL
BASOPHILS NFR BLD AUTO: 0.8 %
BILIRUB SERPL-MCNC: 0.7 MG/DL
BUN SERPL-MCNC: 17 MG/DL
CALCIUM SERPL-MCNC: 9.6 MG/DL
CHLORIDE SERPL-SCNC: 98 MMOL/L
CHOLEST SERPL-MCNC: 145 MG/DL
CO2 SERPL-SCNC: 29 MMOL/L
CREAT SERPL-MCNC: 0.97 MG/DL
EOSINOPHIL # BLD AUTO: 0.05 K/UL
EOSINOPHIL NFR BLD AUTO: 0.8 %
FOLATE SERPL-MCNC: 19 NG/ML
GLUCOSE SERPL-MCNC: 100 MG/DL
HCT VFR BLD CALC: 40.5 %
HDLC SERPL-MCNC: 75 MG/DL
HGB BLD-MCNC: 13.5 G/DL
IMM GRANULOCYTES NFR BLD AUTO: 0.3 %
LDLC SERPL CALC-MCNC: 58 MG/DL
LYMPHOCYTES # BLD AUTO: 1.22 K/UL
LYMPHOCYTES NFR BLD AUTO: 20.1 %
MAN DIFF?: NORMAL
MCHC RBC-ENTMCNC: 31 PG
MCHC RBC-ENTMCNC: 33.3 GM/DL
MCV RBC AUTO: 92.9 FL
MONOCYTES # BLD AUTO: 0.55 K/UL
MONOCYTES NFR BLD AUTO: 9.1 %
NEUTROPHILS # BLD AUTO: 4.18 K/UL
NEUTROPHILS NFR BLD AUTO: 68.9 %
NONHDLC SERPL-MCNC: 71 MG/DL
PLATELET # BLD AUTO: 211 K/UL
POTASSIUM SERPL-SCNC: 3.9 MMOL/L
PROT SERPL-MCNC: 7.3 G/DL
RBC # BLD: 4.36 M/UL
RBC # FLD: 13.5 %
SODIUM SERPL-SCNC: 139 MMOL/L
TRIGL SERPL-MCNC: 62 MG/DL
TSH SERPL-ACNC: 3.62 UIU/ML
URATE SERPL-MCNC: 5.1 MG/DL
VIT B12 SERPL-MCNC: 557 PG/ML
WBC # FLD AUTO: 6.07 K/UL

## 2021-10-28 LAB
APPEARANCE: CLEAR
BACTERIA: NEGATIVE
BILIRUBIN URINE: NEGATIVE
BLOOD URINE: NEGATIVE
COLOR: YELLOW
ESTIMATED AVERAGE GLUCOSE: 120 MG/DL
GLUCOSE QUALITATIVE U: NEGATIVE
HBA1C MFR BLD HPLC: 5.8 %
HYALINE CASTS: 1 /LPF
KETONES URINE: NEGATIVE
LEUKOCYTE ESTERASE URINE: NEGATIVE
MICROSCOPIC-UA: NORMAL
NITRITE URINE: NEGATIVE
PH URINE: 6.5
PROTEIN URINE: NEGATIVE
RED BLOOD CELLS URINE: 4 /HPF
SPECIFIC GRAVITY URINE: 1.02
SQUAMOUS EPITHELIAL CELLS: 1 /HPF
UROBILINOGEN URINE: NORMAL
WHITE BLOOD CELLS URINE: 1 /HPF

## 2021-10-29 ENCOUNTER — APPOINTMENT (OUTPATIENT)
Dept: INTERNAL MEDICINE | Facility: CLINIC | Age: 81
End: 2021-10-29
Payer: MEDICARE

## 2021-10-29 VITALS
HEIGHT: 58 IN | RESPIRATION RATE: 14 BRPM | BODY MASS INDEX: 24.14 KG/M2 | HEART RATE: 63 BPM | OXYGEN SATURATION: 93 % | SYSTOLIC BLOOD PRESSURE: 120 MMHG | TEMPERATURE: 97.9 F | WEIGHT: 115 LBS | DIASTOLIC BLOOD PRESSURE: 70 MMHG

## 2021-10-29 DIAGNOSIS — I48.91 UNSPECIFIED ATRIAL FIBRILLATION: ICD-10-CM

## 2021-10-29 DIAGNOSIS — I10 ESSENTIAL (PRIMARY) HYPERTENSION: ICD-10-CM

## 2021-10-29 DIAGNOSIS — Z12.39 ENCOUNTER FOR OTHER SCREENING FOR MALIGNANT NEOPLASM OF BREAST: ICD-10-CM

## 2021-10-29 PROCEDURE — G0439: CPT

## 2021-10-29 NOTE — HEALTH RISK ASSESSMENT
[Yes] : Yes [2 - 4 times a month (2 pts)] : 2-4 times a month (2 points) [1 or 2 (0 pts)] : 1 or 2 (0 points) [No] : In the past 12 months have you used drugs other than those required for medical reasons? No [No falls in past year] : Patient reported no falls in the past year [0] : 2) Feeling down, depressed, or hopeless: Not at all (0) [PHQ-2 Negative - No further assessment needed] : PHQ-2 Negative - No further assessment needed [Fully functional (bathing, dressing, toileting, transferring, walking, feeding)] : Fully functional (bathing, dressing, toileting, transferring, walking, feeding) [Fully functional (using the telephone, shopping, preparing meals, housekeeping, doing laundry, using] : Fully functional and needs no help or supervision to perform IADLs (using the telephone, shopping, preparing meals, housekeeping, doing laundry, using transportation, managing medications and managing finances) [] : No [Change in mental status noted] : No change in mental status noted [Language] : denies difficulty with language

## 2021-10-29 NOTE — HISTORY OF PRESENT ILLNESS
[de-identified] : Pt continues to have numbness on right side of face and mouth. She feels like she has to accentuate her words. She was seeing Dr Ordonez from neurology. \par \par She does have some feeling off balance sometimes.

## 2021-11-21 LAB
ALBUMIN SERPL ELPH-MCNC: 4.3 G/DL
ALP BLD-CCNC: 88 U/L
ALT SERPL-CCNC: 9 U/L
ANION GAP SERPL CALC-SCNC: 15 MMOL/L
AST SERPL-CCNC: 21 U/L
BASOPHILS # BLD AUTO: 0.05 K/UL
BASOPHILS NFR BLD AUTO: 0.9 %
BILIRUB SERPL-MCNC: 0.7 MG/DL
BUN SERPL-MCNC: 22 MG/DL
CALCIUM SERPL-MCNC: 9.8 MG/DL
CHLORIDE SERPL-SCNC: 101 MMOL/L
CO2 SERPL-SCNC: 26 MMOL/L
CREAT SERPL-MCNC: 0.93 MG/DL
EOSINOPHIL # BLD AUTO: 0.07 K/UL
EOSINOPHIL NFR BLD AUTO: 1.2 %
GLUCOSE SERPL-MCNC: 96 MG/DL
HCT VFR BLD CALC: 41.1 %
HGB BLD-MCNC: 13 G/DL
IMM GRANULOCYTES NFR BLD AUTO: 0.4 %
LYMPHOCYTES # BLD AUTO: 1.37 K/UL
LYMPHOCYTES NFR BLD AUTO: 24.3 %
MAN DIFF?: NORMAL
MCHC RBC-ENTMCNC: 29.8 PG
MCHC RBC-ENTMCNC: 31.6 GM/DL
MCV RBC AUTO: 94.3 FL
MONOCYTES # BLD AUTO: 0.61 K/UL
MONOCYTES NFR BLD AUTO: 10.8 %
NEUTROPHILS # BLD AUTO: 3.51 K/UL
NEUTROPHILS NFR BLD AUTO: 62.4 %
PLATELET # BLD AUTO: 201 K/UL
POTASSIUM SERPL-SCNC: 4.3 MMOL/L
PROT SERPL-MCNC: 7.2 G/DL
RBC # BLD: 4.36 M/UL
RBC # FLD: 13.6 %
SODIUM SERPL-SCNC: 142 MMOL/L
T3 SERPL-MCNC: 128 NG/DL
T4 FREE SERPL-MCNC: 1 NG/DL
TSH SERPL-ACNC: 6.46 UIU/ML
WBC # FLD AUTO: 5.63 K/UL

## 2021-12-13 ENCOUNTER — RX RENEWAL (OUTPATIENT)
Age: 81
End: 2021-12-13

## 2021-12-16 ENCOUNTER — APPOINTMENT (OUTPATIENT)
Dept: ENDOCRINOLOGY | Facility: CLINIC | Age: 81
End: 2021-12-16
Payer: MEDICARE

## 2021-12-16 VITALS
OXYGEN SATURATION: 95 % | SYSTOLIC BLOOD PRESSURE: 113 MMHG | BODY MASS INDEX: 24.14 KG/M2 | DIASTOLIC BLOOD PRESSURE: 74 MMHG | WEIGHT: 115 LBS | HEART RATE: 71 BPM | HEIGHT: 58 IN

## 2021-12-16 PROCEDURE — 99214 OFFICE O/P EST MOD 30 MIN: CPT

## 2021-12-16 NOTE — ASSESSMENT
[FreeTextEntry1] : 81 year old female  with a past medical history of Graves Disease, afib, HTN, CVA who presents for management of her thyroid disease.\par \par 1. Graves Disease\par Controlled on Methimazole 5 mg QD\par Discussed risks of Methimazole. Elevated LFTs, agranulocytosis, neutropenia. To call with any fever or infection.\par Will repeat labs, thyroid US at follow up\par May plan to slowly titrate off Methimazole based on levels\par \par 2. Thyroid Nodule\par Follow up US in 4/2021\par

## 2021-12-16 NOTE — HISTORY OF PRESENT ILLNESS
[FreeTextEntry1] : Ms. DANN CURRY is a 81 year old female  with a past medical history of Graves Disease, afib, HTN, CVA who presents for management of her thyroid disease.\par \par Initial History:\par Patient was first diagnosed with thyroid disorder in 1998. She takes methimazole 5 mg QD. She was following with an endocrinologist and is changing due to insurance. She is adopted and does not know family history. She denies any exposure to radiation. She feels well and denies any weight changes, diarrhea, constipation, sob, tremors, anxiety, depression, temperature intolerance. \par \par \par

## 2022-02-06 ENCOUNTER — RX RENEWAL (OUTPATIENT)
Age: 82
End: 2022-02-06

## 2022-03-17 ENCOUNTER — LABORATORY RESULT (OUTPATIENT)
Age: 82
End: 2022-03-17

## 2022-04-13 ENCOUNTER — APPOINTMENT (OUTPATIENT)
Dept: ENDOCRINOLOGY | Facility: CLINIC | Age: 82
End: 2022-04-13

## 2022-04-14 ENCOUNTER — RESULT REVIEW (OUTPATIENT)
Age: 82
End: 2022-04-14

## 2022-04-14 ENCOUNTER — APPOINTMENT (OUTPATIENT)
Dept: MAMMOGRAPHY | Facility: CLINIC | Age: 82
End: 2022-04-14
Payer: MEDICARE

## 2022-04-14 ENCOUNTER — OUTPATIENT (OUTPATIENT)
Dept: OUTPATIENT SERVICES | Facility: HOSPITAL | Age: 82
LOS: 1 days | End: 2022-04-14
Payer: COMMERCIAL

## 2022-04-14 ENCOUNTER — APPOINTMENT (OUTPATIENT)
Dept: RADIOLOGY | Facility: CLINIC | Age: 82
End: 2022-04-14
Payer: MEDICARE

## 2022-04-14 ENCOUNTER — APPOINTMENT (OUTPATIENT)
Dept: ULTRASOUND IMAGING | Facility: CLINIC | Age: 82
End: 2022-04-14
Payer: MEDICARE

## 2022-04-14 DIAGNOSIS — Z90.49 ACQUIRED ABSENCE OF OTHER SPECIFIED PARTS OF DIGESTIVE TRACT: Chronic | ICD-10-CM

## 2022-04-14 DIAGNOSIS — Z98.890 OTHER SPECIFIED POSTPROCEDURAL STATES: Chronic | ICD-10-CM

## 2022-04-14 DIAGNOSIS — R92.2 INCONCLUSIVE MAMMOGRAM: ICD-10-CM

## 2022-04-14 DIAGNOSIS — E05.90 THYROTOXICOSIS, UNSPECIFIED WITHOUT THYROTOXIC CRISIS OR STORM: ICD-10-CM

## 2022-04-14 DIAGNOSIS — Z90.89 ACQUIRED ABSENCE OF OTHER ORGANS: Chronic | ICD-10-CM

## 2022-04-14 DIAGNOSIS — Z12.39 ENCOUNTER FOR OTHER SCREENING FOR MALIGNANT NEOPLASM OF BREAST: ICD-10-CM

## 2022-04-14 DIAGNOSIS — Z00.00 ENCOUNTER FOR GENERAL ADULT MEDICAL EXAMINATION WITHOUT ABNORMAL FINDINGS: ICD-10-CM

## 2022-04-14 PROCEDURE — 76641 ULTRASOUND BREAST COMPLETE: CPT | Mod: 26,50

## 2022-04-14 PROCEDURE — 77063 BREAST TOMOSYNTHESIS BI: CPT | Mod: 26

## 2022-04-14 PROCEDURE — 76536 US EXAM OF HEAD AND NECK: CPT | Mod: 26

## 2022-04-14 PROCEDURE — 77067 SCR MAMMO BI INCL CAD: CPT | Mod: 26

## 2022-04-14 PROCEDURE — 76536 US EXAM OF HEAD AND NECK: CPT

## 2022-04-14 PROCEDURE — 76641 ULTRASOUND BREAST COMPLETE: CPT

## 2022-04-14 PROCEDURE — 77067 SCR MAMMO BI INCL CAD: CPT

## 2022-04-14 PROCEDURE — 77063 BREAST TOMOSYNTHESIS BI: CPT

## 2022-04-19 ENCOUNTER — OUTPATIENT (OUTPATIENT)
Dept: OUTPATIENT SERVICES | Facility: HOSPITAL | Age: 82
LOS: 1 days | End: 2022-04-19
Payer: COMMERCIAL

## 2022-04-19 ENCOUNTER — RESULT REVIEW (OUTPATIENT)
Age: 82
End: 2022-04-19

## 2022-04-19 ENCOUNTER — APPOINTMENT (OUTPATIENT)
Dept: ULTRASOUND IMAGING | Facility: CLINIC | Age: 82
End: 2022-04-19
Payer: MEDICARE

## 2022-04-19 DIAGNOSIS — Z98.890 OTHER SPECIFIED POSTPROCEDURAL STATES: Chronic | ICD-10-CM

## 2022-04-19 DIAGNOSIS — Z90.89 ACQUIRED ABSENCE OF OTHER ORGANS: Chronic | ICD-10-CM

## 2022-04-19 DIAGNOSIS — Z90.49 ACQUIRED ABSENCE OF OTHER SPECIFIED PARTS OF DIGESTIVE TRACT: Chronic | ICD-10-CM

## 2022-04-19 DIAGNOSIS — R92.8 OTHER ABNORMAL AND INCONCLUSIVE FINDINGS ON DIAGNOSTIC IMAGING OF BREAST: ICD-10-CM

## 2022-04-19 DIAGNOSIS — Z00.8 ENCOUNTER FOR OTHER GENERAL EXAMINATION: ICD-10-CM

## 2022-04-19 PROCEDURE — 76642 ULTRASOUND BREAST LIMITED: CPT

## 2022-04-19 PROCEDURE — 76642 ULTRASOUND BREAST LIMITED: CPT | Mod: 26,RT

## 2022-04-27 ENCOUNTER — OUTPATIENT (OUTPATIENT)
Dept: OUTPATIENT SERVICES | Facility: HOSPITAL | Age: 82
LOS: 1 days | End: 2022-04-27
Payer: COMMERCIAL

## 2022-04-27 ENCOUNTER — APPOINTMENT (OUTPATIENT)
Dept: ULTRASOUND IMAGING | Facility: CLINIC | Age: 82
End: 2022-04-27
Payer: MEDICARE

## 2022-04-27 ENCOUNTER — RESULT REVIEW (OUTPATIENT)
Age: 82
End: 2022-04-27

## 2022-04-27 DIAGNOSIS — R92.8 OTHER ABNORMAL AND INCONCLUSIVE FINDINGS ON DIAGNOSTIC IMAGING OF BREAST: ICD-10-CM

## 2022-04-27 DIAGNOSIS — Z00.8 ENCOUNTER FOR OTHER GENERAL EXAMINATION: ICD-10-CM

## 2022-04-27 DIAGNOSIS — Z90.89 ACQUIRED ABSENCE OF OTHER ORGANS: Chronic | ICD-10-CM

## 2022-04-27 DIAGNOSIS — Z98.890 OTHER SPECIFIED POSTPROCEDURAL STATES: Chronic | ICD-10-CM

## 2022-04-27 DIAGNOSIS — Z90.49 ACQUIRED ABSENCE OF OTHER SPECIFIED PARTS OF DIGESTIVE TRACT: Chronic | ICD-10-CM

## 2022-04-27 PROCEDURE — A4648: CPT

## 2022-04-27 PROCEDURE — 77065 DX MAMMO INCL CAD UNI: CPT | Mod: 26,RT

## 2022-04-27 PROCEDURE — 19083 BX BREAST 1ST LESION US IMAG: CPT

## 2022-04-27 PROCEDURE — 77065 DX MAMMO INCL CAD UNI: CPT

## 2022-04-27 PROCEDURE — 19083 BX BREAST 1ST LESION US IMAG: CPT | Mod: RT

## 2022-04-27 PROCEDURE — 88305 TISSUE EXAM BY PATHOLOGIST: CPT

## 2022-04-27 PROCEDURE — 88305 TISSUE EXAM BY PATHOLOGIST: CPT | Mod: 26

## 2022-05-03 LAB — SURGICAL PATHOLOGY STUDY: SIGNIFICANT CHANGE UP

## 2022-07-09 NOTE — ED PROVIDER NOTE - CARDIAC, MLM
[FreeTextEntry1] : 73 year old male with PMH of DM with advanced neuropathy, HTN and CAD s/p CABG, PAD (s/p stents to R BALTA in 12/2021, L BALTA and LPA in 05/2022) presents for follow up. Reports no pain in LE with activity or at rest, denies swelling. Patient underwent PAD intervention in May 2022 for nonhealing L hallux ulcer. Has chronic improving R hallux ulcer as well for which he underwent intervention in 12/2021\par \par Left LE Arterial duplex 06/2022 >75% L peroneal artery stenosis, LPTA with mild atherosclerosis, patent L BALTA stent without any restenosis\par LEELA w/PVR 06/2022 R LEELA 1.12 and L LEELA 1.11\par Angiography of BLE 05/2022 100% L BALTA stenosis and 80% LPA stenosis s/p stents\par Coronary angio. 08/12/2021 triple vessel disease. Patent graft. GDMT\par NM stress 07/2021 showed reversible Inferolateral infarct\par \par Healing L hallux ulcer without any signs of infection\par Healing R hallux ulcer without any signs of infection\par PAD s/p stents to R BALTA in 12/2021, L BALTA and LPA in 05/2022\par Dyspnea on exertion\par \par Plan\par - Start mild exertion - 5 min walk in am and pm to prevent further deconditioning\par - Follow up with primary cardiologist for dyspnea workup\par - Labs reviewed. Benign\par - Continue aspirin, plavix, statins, norvasc\par - Wear wool socks in cold weather for acrocyanosis (on norvasc 10 mg already)\par - Avoid pressure to plantar surface of big toes\par - Follow-up with podiatrist, change dressing daily as prescribed\par \par RTC in 2 months
Normal rate, regular rhythm.  Heart sounds S1, S2.  No murmurs, rubs or gallops.

## 2022-08-23 ENCOUNTER — RX RENEWAL (OUTPATIENT)
Age: 82
End: 2022-08-23

## 2022-08-26 ENCOUNTER — RX RENEWAL (OUTPATIENT)
Age: 82
End: 2022-08-26

## 2022-11-02 ENCOUNTER — APPOINTMENT (OUTPATIENT)
Dept: INTERNAL MEDICINE | Facility: CLINIC | Age: 82
End: 2022-11-02

## 2022-11-02 VITALS
RESPIRATION RATE: 16 BRPM | HEART RATE: 72 BPM | DIASTOLIC BLOOD PRESSURE: 76 MMHG | BODY MASS INDEX: 22.04 KG/M2 | TEMPERATURE: 97.6 F | SYSTOLIC BLOOD PRESSURE: 120 MMHG | HEIGHT: 58 IN | OXYGEN SATURATION: 97 % | WEIGHT: 105 LBS

## 2022-11-02 PROCEDURE — G0439: CPT

## 2022-11-02 RX ORDER — DABIGATRAN ETEXILATE MESYLATE 150 MG/1
150 CAPSULE ORAL TWICE DAILY
Qty: 60 | Refills: 0 | Status: DISCONTINUED | COMMUNITY
Start: 2018-12-28 | End: 2022-11-02

## 2022-11-02 RX ORDER — APIXABAN 5 MG/1
5 TABLET, FILM COATED ORAL
Qty: 180 | Refills: 0 | Status: ACTIVE | COMMUNITY
Start: 2022-03-28

## 2022-11-02 RX ORDER — ATORVASTATIN CALCIUM 40 MG/1
40 TABLET, FILM COATED ORAL
Qty: 30 | Refills: 5 | Status: DISCONTINUED | COMMUNITY
Start: 2019-02-20 | End: 2022-11-02

## 2022-11-02 NOTE — PHYSICAL EXAM
[No Acute Distress] : no acute distress [Normal Sclera/Conjunctiva] : normal sclera/conjunctiva [PERRL] : pupils equal round and reactive to light [EOMI] : extraocular movements intact [Normal TMs] : both tympanic membranes were normal [No Lymphadenopathy] : no lymphadenopathy [Supple] : supple [Thyroid Normal, No Nodules] : the thyroid was normal and there were no nodules present [No Respiratory Distress] : no respiratory distress  [No Accessory Muscle Use] : no accessory muscle use [Clear to Auscultation] : lungs were clear to auscultation bilaterally [Normal Rate] : normal rate  [Regular Rhythm] : with a regular rhythm [Normal S1, S2] : normal S1 and S2 [No Murmur] : no murmur heard [No Edema] : there was no peripheral edema [Soft] : abdomen soft [Non Tender] : non-tender [Non-distended] : non-distended [Normal Bowel Sounds] : normal bowel sounds [Normal Posterior Cervical Nodes] : no posterior cervical lymphadenopathy [Normal Anterior Cervical Nodes] : no anterior cervical lymphadenopathy [No Spinal Tenderness] : no spinal tenderness [Grossly Normal Strength/Tone] : grossly normal strength/tone [No Focal Deficits] : no focal deficits [Normal Gait] : normal gait [Normal Affect] : the affect was normal [Normal Insight/Judgement] : insight and judgment were intact [de-identified] : rigidity, resting tremor

## 2022-11-02 NOTE — HEALTH RISK ASSESSMENT
[Good] : ~his/her~  mood as  good [Never] : Never [0] : 2) Feeling down, depressed, or hopeless: Not at all (0) [PHQ-2 Negative - No further assessment needed] : PHQ-2 Negative - No further assessment needed [Alone] : lives alone [] :  [Fully functional (bathing, dressing, toileting, transferring, walking, feeding)] : Fully functional (bathing, dressing, toileting, transferring, walking, feeding) [Fully functional (using the telephone, shopping, preparing meals, housekeeping, doing laundry, using] : Fully functional and needs no help or supervision to perform IADLs (using the telephone, shopping, preparing meals, housekeeping, doing laundry, using transportation, managing medications and managing finances) [Reports changes in vision] : Reports changes in vision [Smoke Detector] : smoke detector [Seat Belt] :  uses seat belt [With Patient/Caregiver] : , with patient/caregiver [Name: ___] : Health Care Proxy's Name: [unfilled]  [Relationship: ___] : Relationship: [unfilled] [[id number]] : 0 [Change in mental status noted] : No change in mental status noted [Reports changes in hearing] : Reports no changes in hearing [AdvancecareDate] : 11/22

## 2022-11-02 NOTE — ASSESSMENT
[FreeTextEntry1] : HCM: Check labs. Recommended shingrix at the pharmacy. \par Hyperthyroidism: On methimazole. Follows with Dr Mabry. \par cVS: BP, HR controlled. On amlodipine, HCTz, losartan, metoprolol, eliquis. \par Tremor: Recommended follow-up with Dr Bailey (neuro). \par

## 2022-11-03 LAB
25(OH)D3 SERPL-MCNC: 22.8 NG/ML
ALBUMIN SERPL ELPH-MCNC: 4.5 G/DL
ALP BLD-CCNC: 87 U/L
ALT SERPL-CCNC: 10 U/L
ANION GAP SERPL CALC-SCNC: 11 MMOL/L
AST SERPL-CCNC: 18 U/L
BASOPHILS # BLD AUTO: 0.05 K/UL
BASOPHILS NFR BLD AUTO: 0.7 %
BILIRUB SERPL-MCNC: 0.4 MG/DL
BUN SERPL-MCNC: 30 MG/DL
CALCIUM SERPL-MCNC: 9.9 MG/DL
CHLORIDE SERPL-SCNC: 98 MMOL/L
CHOLEST SERPL-MCNC: 146 MG/DL
CO2 SERPL-SCNC: 32 MMOL/L
CREAT SERPL-MCNC: 1.08 MG/DL
EGFR: 51 ML/MIN/1.73M2
EOSINOPHIL # BLD AUTO: 0.04 K/UL
EOSINOPHIL NFR BLD AUTO: 0.6 %
ESTIMATED AVERAGE GLUCOSE: 120 MG/DL
GLUCOSE SERPL-MCNC: 109 MG/DL
HBA1C MFR BLD HPLC: 5.8 %
HCT VFR BLD CALC: 37.4 %
HDLC SERPL-MCNC: 79 MG/DL
HGB BLD-MCNC: 12.5 G/DL
IMM GRANULOCYTES NFR BLD AUTO: 0.1 %
LDLC SERPL CALC-MCNC: 58 MG/DL
LYMPHOCYTES # BLD AUTO: 1.69 K/UL
LYMPHOCYTES NFR BLD AUTO: 23.5 %
MAN DIFF?: NORMAL
MCHC RBC-ENTMCNC: 30.6 PG
MCHC RBC-ENTMCNC: 33.4 GM/DL
MCV RBC AUTO: 91.7 FL
MONOCYTES # BLD AUTO: 0.58 K/UL
MONOCYTES NFR BLD AUTO: 8.1 %
NEUTROPHILS # BLD AUTO: 4.83 K/UL
NEUTROPHILS NFR BLD AUTO: 67 %
NONHDLC SERPL-MCNC: 67 MG/DL
PLATELET # BLD AUTO: 199 K/UL
POTASSIUM SERPL-SCNC: 3.6 MMOL/L
PROT SERPL-MCNC: 7.3 G/DL
RBC # BLD: 4.08 M/UL
RBC # FLD: 13.8 %
SODIUM SERPL-SCNC: 140 MMOL/L
T3 SERPL-MCNC: 118 NG/DL
T4 FREE SERPL-MCNC: 1.6 NG/DL
TRIGL SERPL-MCNC: 48 MG/DL
TSH SERPL-ACNC: 0.08 UIU/ML
WBC # FLD AUTO: 7.2 K/UL

## 2022-11-29 ENCOUNTER — APPOINTMENT (OUTPATIENT)
Dept: ENDOCRINOLOGY | Facility: CLINIC | Age: 82
End: 2022-11-29

## 2022-12-01 ENCOUNTER — APPOINTMENT (OUTPATIENT)
Dept: ENDOCRINOLOGY | Facility: CLINIC | Age: 82
End: 2022-12-01

## 2022-12-01 VITALS
DIASTOLIC BLOOD PRESSURE: 78 MMHG | HEIGHT: 58 IN | BODY MASS INDEX: 22.04 KG/M2 | SYSTOLIC BLOOD PRESSURE: 118 MMHG | OXYGEN SATURATION: 99 % | WEIGHT: 105 LBS | HEART RATE: 80 BPM

## 2022-12-01 PROCEDURE — 99214 OFFICE O/P EST MOD 30 MIN: CPT

## 2022-12-02 NOTE — PHYSICAL EXAM
[Alert] : alert [Well Nourished] : well nourished [No Acute Distress] : no acute distress [Well Developed] : well developed [Normal Sclera/Conjunctiva] : normal sclera/conjunctiva [EOMI] : extra ocular movement intact [No Proptosis] : no proptosis [Normal Oropharynx] : the oropharynx was normal [Thyroid Not Enlarged] : the thyroid was not enlarged [No Thyroid Nodules] : no palpable thyroid nodules [No Respiratory Distress] : no respiratory distress [No Accessory Muscle Use] : no accessory muscle use [Clear to Auscultation] : lungs were clear to auscultation bilaterally [Normal S1, S2] : normal S1 and S2 [Normal Rate] : heart rate was normal [Regular Rhythm] : with a regular rhythm [No Edema] : no peripheral edema [Pedal Pulses Normal] : the pedal pulses are present [Normal Bowel Sounds] : normal bowel sounds [Not Tender] : non-tender [Not Distended] : not distended [Soft] : abdomen soft [Normal Anterior Cervical Nodes] : no anterior cervical lymphadenopathy [Normal Posterior Cervical Nodes] : no posterior cervical lymphadenopathy [No Spinal Tenderness] : no spinal tenderness [Spine Straight] : spine straight [No Stigmata of Cushings Syndrome] : no stigmata of Cushings Syndrome [Normal Gait] : normal gait [Normal Strength/Tone] : muscle strength and tone were normal [No Rash] : no rash [Oriented x3] : oriented to person, place, and time [Acanthosis Nigricans] : no acanthosis nigricans [de-identified] : + tremors

## 2022-12-02 NOTE — ASSESSMENT
[FreeTextEntry1] : 82 year old female  with a past medical history of Graves Disease, afib, HTN, CVA who presents for management of her thyroid disease.\par \par 1. Graves Disease\par Patients levels are in hyperthyroid range\par Will inc Methimazole to 10 mg QD\par Will recheck levels and antibodies today\par Blood drawn in the office\par \par 2. Thyroid Nodule\par US stable\par US next year\par

## 2022-12-02 NOTE — HISTORY OF PRESENT ILLNESS
[FreeTextEntry1] : Ms. DANN CURRY is a 82 year old female  with a past medical history of Graves Disease, afib, HTN, CVA who presents for management of her thyroid disease. Patient was recently checked by PCP and her levels are hyperthyroid now. She has noted more anxiety and tremors. She denies any recent contrast studies or illness but has been under stress. \par \par Initial History:\par Patient was first diagnosed with thyroid disorder in 1998. She takes methimazole 5 mg QD. She was following with an endocrinologist and is changing due to insurance. She is adopted and does not know family history. She denies any exposure to radiation. She feels well and denies any weight changes, diarrhea, constipation, sob, tremors, anxiety, depression, temperature intolerance. \par \par \par

## 2022-12-15 LAB
T3 SERPL-MCNC: 122 NG/DL
T4 FREE SERPL-MCNC: 1.5 NG/DL
TSH RECEPTOR AB: 2.75 IU/L
TSH SERPL-ACNC: 0.16 UIU/ML
TSI ACT/NOR SER: 2.81 IU/L

## 2023-02-08 ENCOUNTER — APPOINTMENT (OUTPATIENT)
Dept: ENDOCRINOLOGY | Facility: CLINIC | Age: 83
End: 2023-02-08

## 2023-03-13 ENCOUNTER — APPOINTMENT (OUTPATIENT)
Dept: NEUROLOGY | Facility: CLINIC | Age: 83
End: 2023-03-13
Payer: MEDICARE

## 2023-03-13 ENCOUNTER — RX RENEWAL (OUTPATIENT)
Age: 83
End: 2023-03-13

## 2023-03-13 VITALS — SYSTOLIC BLOOD PRESSURE: 103 MMHG | DIASTOLIC BLOOD PRESSURE: 65 MMHG | OXYGEN SATURATION: 96 % | HEART RATE: 73 BPM

## 2023-03-13 VITALS
SYSTOLIC BLOOD PRESSURE: 97 MMHG | HEIGHT: 58 IN | DIASTOLIC BLOOD PRESSURE: 60 MMHG | HEART RATE: 69 BPM | BODY MASS INDEX: 22.25 KG/M2 | WEIGHT: 106 LBS | OXYGEN SATURATION: 96 %

## 2023-03-13 VITALS — OXYGEN SATURATION: 96 % | DIASTOLIC BLOOD PRESSURE: 62 MMHG | SYSTOLIC BLOOD PRESSURE: 92 MMHG | HEART RATE: 71 BPM

## 2023-03-13 PROCEDURE — 99205 OFFICE O/P NEW HI 60 MIN: CPT

## 2023-03-27 ENCOUNTER — APPOINTMENT (OUTPATIENT)
Dept: NUCLEAR MEDICINE | Facility: CLINIC | Age: 83
End: 2023-03-27
Payer: MEDICARE

## 2023-03-27 ENCOUNTER — OUTPATIENT (OUTPATIENT)
Dept: OUTPATIENT SERVICES | Facility: HOSPITAL | Age: 83
LOS: 1 days | End: 2023-03-27

## 2023-03-27 ENCOUNTER — NON-APPOINTMENT (OUTPATIENT)
Age: 83
End: 2023-03-27

## 2023-03-27 DIAGNOSIS — Z00.8 ENCOUNTER FOR OTHER GENERAL EXAMINATION: ICD-10-CM

## 2023-03-27 DIAGNOSIS — Z90.89 ACQUIRED ABSENCE OF OTHER ORGANS: Chronic | ICD-10-CM

## 2023-03-27 DIAGNOSIS — Z90.49 ACQUIRED ABSENCE OF OTHER SPECIFIED PARTS OF DIGESTIVE TRACT: Chronic | ICD-10-CM

## 2023-03-27 DIAGNOSIS — Z98.890 OTHER SPECIFIED POSTPROCEDURAL STATES: Chronic | ICD-10-CM

## 2023-03-27 PROCEDURE — 78803 RP LOCLZJ TUM SPECT 1 AREA: CPT | Mod: 26

## 2023-04-03 ENCOUNTER — APPOINTMENT (OUTPATIENT)
Dept: MRI IMAGING | Facility: CLINIC | Age: 83
End: 2023-04-03

## 2023-04-11 ENCOUNTER — APPOINTMENT (OUTPATIENT)
Dept: MRI IMAGING | Facility: CLINIC | Age: 83
End: 2023-04-11

## 2023-04-14 ENCOUNTER — EMERGENCY (EMERGENCY)
Facility: HOSPITAL | Age: 83
LOS: 1 days | Discharge: HOME | End: 2023-04-14
Attending: EMERGENCY MEDICINE | Admitting: EMERGENCY MEDICINE
Payer: COMMERCIAL

## 2023-04-14 VITALS
OXYGEN SATURATION: 98 % | SYSTOLIC BLOOD PRESSURE: 119 MMHG | RESPIRATION RATE: 18 BRPM | TEMPERATURE: 98 F | WEIGHT: 106.92 LBS | DIASTOLIC BLOOD PRESSURE: 77 MMHG | HEART RATE: 82 BPM | HEIGHT: 58 IN

## 2023-04-14 VITALS — OXYGEN SATURATION: 97 % | RESPIRATION RATE: 18 BRPM

## 2023-04-14 DIAGNOSIS — Z90.89 ACQUIRED ABSENCE OF OTHER ORGANS: Chronic | ICD-10-CM

## 2023-04-14 DIAGNOSIS — Z90.49 ACQUIRED ABSENCE OF OTHER SPECIFIED PARTS OF DIGESTIVE TRACT: Chronic | ICD-10-CM

## 2023-04-14 DIAGNOSIS — Z98.890 OTHER SPECIFIED POSTPROCEDURAL STATES: Chronic | ICD-10-CM

## 2023-04-14 LAB
ALBUMIN SERPL ELPH-MCNC: 3.5 G/DL — SIGNIFICANT CHANGE UP (ref 3.3–5)
ALP SERPL-CCNC: 101 U/L — SIGNIFICANT CHANGE UP (ref 40–120)
ALT FLD-CCNC: 19 U/L — SIGNIFICANT CHANGE UP (ref 12–78)
ANION GAP SERPL CALC-SCNC: 6 MMOL/L — SIGNIFICANT CHANGE UP (ref 5–17)
APPEARANCE UR: CLEAR — SIGNIFICANT CHANGE UP
APTT BLD: 32.7 SEC — SIGNIFICANT CHANGE UP (ref 27.5–35.5)
AST SERPL-CCNC: 19 U/L — SIGNIFICANT CHANGE UP (ref 15–37)
BASOPHILS # BLD AUTO: 0.03 K/UL — SIGNIFICANT CHANGE UP (ref 0–0.2)
BASOPHILS NFR BLD AUTO: 0.4 % — SIGNIFICANT CHANGE UP (ref 0–2)
BILIRUB SERPL-MCNC: 0.5 MG/DL — SIGNIFICANT CHANGE UP (ref 0.2–1.2)
BILIRUB UR-MCNC: NEGATIVE — SIGNIFICANT CHANGE UP
BUN SERPL-MCNC: 29 MG/DL — HIGH (ref 7–23)
CALCIUM SERPL-MCNC: 9 MG/DL — SIGNIFICANT CHANGE UP (ref 8.5–10.1)
CHLORIDE SERPL-SCNC: 107 MMOL/L — SIGNIFICANT CHANGE UP (ref 96–108)
CO2 SERPL-SCNC: 29 MMOL/L — SIGNIFICANT CHANGE UP (ref 22–31)
COLOR SPEC: YELLOW — SIGNIFICANT CHANGE UP
CREAT SERPL-MCNC: 1 MG/DL — SIGNIFICANT CHANGE UP (ref 0.5–1.3)
DIFF PNL FLD: ABNORMAL
EGFR: 56 ML/MIN/1.73M2 — LOW
EOSINOPHIL # BLD AUTO: 0.03 K/UL — SIGNIFICANT CHANGE UP (ref 0–0.5)
EOSINOPHIL NFR BLD AUTO: 0.4 % — SIGNIFICANT CHANGE UP (ref 0–6)
GLUCOSE SERPL-MCNC: 106 MG/DL — HIGH (ref 70–99)
GLUCOSE UR QL: NEGATIVE — SIGNIFICANT CHANGE UP
HCT VFR BLD CALC: 38.9 % — SIGNIFICANT CHANGE UP (ref 34.5–45)
HGB BLD-MCNC: 12.8 G/DL — SIGNIFICANT CHANGE UP (ref 11.5–15.5)
IMM GRANULOCYTES NFR BLD AUTO: 0.1 % — SIGNIFICANT CHANGE UP (ref 0–0.9)
INR BLD: 1.46 RATIO — HIGH (ref 0.88–1.16)
KETONES UR-MCNC: NEGATIVE — SIGNIFICANT CHANGE UP
LEUKOCYTE ESTERASE UR-ACNC: ABNORMAL
LYMPHOCYTES # BLD AUTO: 1.36 K/UL — SIGNIFICANT CHANGE UP (ref 1–3.3)
LYMPHOCYTES # BLD AUTO: 19.9 % — SIGNIFICANT CHANGE UP (ref 13–44)
MCHC RBC-ENTMCNC: 29.6 PG — SIGNIFICANT CHANGE UP (ref 27–34)
MCHC RBC-ENTMCNC: 32.9 GM/DL — SIGNIFICANT CHANGE UP (ref 32–36)
MCV RBC AUTO: 90 FL — SIGNIFICANT CHANGE UP (ref 80–100)
MONOCYTES # BLD AUTO: 0.65 K/UL — SIGNIFICANT CHANGE UP (ref 0–0.9)
MONOCYTES NFR BLD AUTO: 9.5 % — SIGNIFICANT CHANGE UP (ref 2–14)
NEUTROPHILS # BLD AUTO: 4.75 K/UL — SIGNIFICANT CHANGE UP (ref 1.8–7.4)
NEUTROPHILS NFR BLD AUTO: 69.7 % — SIGNIFICANT CHANGE UP (ref 43–77)
NITRITE UR-MCNC: NEGATIVE — SIGNIFICANT CHANGE UP
NRBC # BLD: 0 /100 WBCS — SIGNIFICANT CHANGE UP (ref 0–0)
PH UR: 8 — SIGNIFICANT CHANGE UP (ref 5–8)
PLATELET # BLD AUTO: 189 K/UL — SIGNIFICANT CHANGE UP (ref 150–400)
POTASSIUM SERPL-MCNC: 3.7 MMOL/L — SIGNIFICANT CHANGE UP (ref 3.5–5.3)
POTASSIUM SERPL-SCNC: 3.7 MMOL/L — SIGNIFICANT CHANGE UP (ref 3.5–5.3)
PROT SERPL-MCNC: 7.5 G/DL — SIGNIFICANT CHANGE UP (ref 6–8.3)
PROT UR-MCNC: NEGATIVE — SIGNIFICANT CHANGE UP
PROTHROM AB SERPL-ACNC: 17.1 SEC — HIGH (ref 10.5–13.4)
RBC # BLD: 4.32 M/UL — SIGNIFICANT CHANGE UP (ref 3.8–5.2)
RBC # FLD: 13.4 % — SIGNIFICANT CHANGE UP (ref 10.3–14.5)
SARS-COV-2 RNA SPEC QL NAA+PROBE: SIGNIFICANT CHANGE UP
SODIUM SERPL-SCNC: 142 MMOL/L — SIGNIFICANT CHANGE UP (ref 135–145)
SP GR SPEC: 1.01 — SIGNIFICANT CHANGE UP (ref 1.01–1.02)
TROPONIN I, HIGH SENSITIVITY RESULT: 8.4 NG/L — SIGNIFICANT CHANGE UP
UROBILINOGEN FLD QL: NEGATIVE — SIGNIFICANT CHANGE UP
WBC # BLD: 6.83 K/UL — SIGNIFICANT CHANGE UP (ref 3.8–10.5)
WBC # FLD AUTO: 6.83 K/UL — SIGNIFICANT CHANGE UP (ref 3.8–10.5)

## 2023-04-14 PROCEDURE — 70498 CT ANGIOGRAPHY NECK: CPT | Mod: MA

## 2023-04-14 PROCEDURE — 70496 CT ANGIOGRAPHY HEAD: CPT | Mod: MA

## 2023-04-14 PROCEDURE — 93010 ELECTROCARDIOGRAM REPORT: CPT

## 2023-04-14 PROCEDURE — 70498 CT ANGIOGRAPHY NECK: CPT | Mod: 26,MA

## 2023-04-14 PROCEDURE — 36415 COLL VENOUS BLD VENIPUNCTURE: CPT

## 2023-04-14 PROCEDURE — 81001 URINALYSIS AUTO W/SCOPE: CPT

## 2023-04-14 PROCEDURE — 71045 X-RAY EXAM CHEST 1 VIEW: CPT | Mod: 26

## 2023-04-14 PROCEDURE — 70450 CT HEAD/BRAIN W/O DYE: CPT | Mod: MA

## 2023-04-14 PROCEDURE — 85730 THROMBOPLASTIN TIME PARTIAL: CPT

## 2023-04-14 PROCEDURE — 70496 CT ANGIOGRAPHY HEAD: CPT | Mod: 26,MA

## 2023-04-14 PROCEDURE — 85025 COMPLETE CBC W/AUTO DIFF WBC: CPT

## 2023-04-14 PROCEDURE — 87635 SARS-COV-2 COVID-19 AMP PRB: CPT

## 2023-04-14 PROCEDURE — 93005 ELECTROCARDIOGRAM TRACING: CPT

## 2023-04-14 PROCEDURE — 99285 EMERGENCY DEPT VISIT HI MDM: CPT | Mod: 25

## 2023-04-14 PROCEDURE — 71045 X-RAY EXAM CHEST 1 VIEW: CPT

## 2023-04-14 PROCEDURE — 87086 URINE CULTURE/COLONY COUNT: CPT

## 2023-04-14 PROCEDURE — 80053 COMPREHEN METABOLIC PANEL: CPT

## 2023-04-14 PROCEDURE — 85610 PROTHROMBIN TIME: CPT

## 2023-04-14 PROCEDURE — 99285 EMERGENCY DEPT VISIT HI MDM: CPT

## 2023-04-14 PROCEDURE — 84484 ASSAY OF TROPONIN QUANT: CPT

## 2023-04-14 RX ORDER — NITROFURANTOIN MACROCRYSTAL 50 MG
100 CAPSULE ORAL ONCE
Refills: 0 | Status: COMPLETED | OUTPATIENT
Start: 2023-04-14 | End: 2023-04-14

## 2023-04-14 RX ORDER — MECLIZINE HCL 12.5 MG
1 TABLET ORAL
Qty: 21 | Refills: 0
Start: 2023-04-14 | End: 2023-04-20

## 2023-04-14 RX ORDER — NITROFURANTOIN MACROCRYSTAL 50 MG
1 CAPSULE ORAL
Qty: 14 | Refills: 0
Start: 2023-04-14 | End: 2023-04-20

## 2023-04-14 RX ORDER — MECLIZINE HCL 12.5 MG
25 TABLET ORAL ONCE
Refills: 0 | Status: COMPLETED | OUTPATIENT
Start: 2023-04-14 | End: 2023-04-14

## 2023-04-14 RX ORDER — ONDANSETRON 8 MG/1
1 TABLET, FILM COATED ORAL
Qty: 9 | Refills: 0
Start: 2023-04-14 | End: 2023-04-16

## 2023-04-14 RX ORDER — SODIUM CHLORIDE 9 MG/ML
1000 INJECTION INTRAMUSCULAR; INTRAVENOUS; SUBCUTANEOUS
Refills: 0 | Status: DISCONTINUED | OUTPATIENT
Start: 2023-04-14 | End: 2023-04-18

## 2023-04-14 RX ADMIN — Medication 25 MILLIGRAM(S): at 20:52

## 2023-04-14 RX ADMIN — Medication 100 MILLIGRAM(S): at 21:11

## 2023-04-14 RX ADMIN — SODIUM CHLORIDE 125 MILLILITER(S): 9 INJECTION INTRAMUSCULAR; INTRAVENOUS; SUBCUTANEOUS at 18:44

## 2023-04-14 NOTE — ED PROVIDER NOTE - NSICDXPASTMEDICALHX_GEN_ALL_CORE_FT
PAST MEDICAL HISTORY:  Afib     Cerebrovascular accident     HTN (hypertension)     Overactive thyroid gland     Vertigo

## 2023-04-14 NOTE — ED PROVIDER NOTE - NSICDXPASTSURGICALHX_GEN_ALL_CORE_FT
PAST SURGICAL HISTORY:  H/O colonoscopy 5 polyups removed    History of tonsillectomy     S/P appendectomy

## 2023-04-14 NOTE — ED PROVIDER NOTE - CLINICAL SUMMARY MEDICAL DECISION MAKING FREE TEXT BOX
Patient is an 82-year-old female who presents to the emergency room with reported nausea and lightheadedness.  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  Presents from home via EMS for nausea since yesterday and feeling lightheaded since waking up this morning.  She reports that when she tries to get up from a seated position she becomes lightheaded and unsteady on her feet requiring her to hold on to walk.  Denies any headache visual changes denies a sensation of the room spinning denies vomiting chest pain shortness of breath abdominal pain extremity numbness or weakness.  Of note patient is currently being worked up by her neurologist for Parkinson's on exam patient is lying in bed no acute distress normocephalic atraumatic pupils equal round and reactive heart is regular rate lungs are clear to auscultation abdomen is soft nontender nondistended. No focal neuro deficit.  Patient ambulatory with no ataxia. Patient presenting to the emergency room with reported lightheadedness and nausea.  Concern for possible neurologic event versus orthostatic hypotension versus possible infectious etiology.  Patient would not be a TNK candidate as she is outside the window.  Will obtain screening labs hydrate check UA urine culture obtain CT imaging of the head EKG check orthostatic vital signs and monitor.  Results of labs reviewed no significant abnormality UA with questionable infection we will treat CT imaging of the brain no hemodynamically significant stenosis old lacunar infarcts no acute infarct noted.  Independent review of chest x-ray with no acute infiltrate independent review of EKG normal sinus rhythm at 75.  Patient up and ambulatory asymptomatic at this time stable for discharge home with outpatient neurologic follow-up.  Family at bedside agreed with plan

## 2023-04-14 NOTE — ED PROVIDER NOTE - OBJECTIVE STATEMENT
82-year-old female with past medical history of A-fib on Eliquis, CVA in 2018 with residual right facial droop, hypertension, acid reflux was brought in by EMS from home for nausea since yesterday and feeling lightheaded since waking up this morning.  Patient reports when she tries to get up to walk she feels lightheaded and had to hold on earlier.  She denies any headache, dizziness, vomiting, fever, chills, extremity weakness, abd pain, or all other complaints 82-year-old female with past medical history of A-fib on Eliquis, CVA in 2018 with residual right facial droop, hypertension, acid reflux was brought in by EMS from home for nausea since yesterday and feeling lightheaded since waking up this morning.  Patient reports when she tries to get up to walk she feels lightheaded and had to hold on earlier.  She denies any headache, dizziness, vomiting, fever, chills, extremity weakness, abd pain, or all other complaints. Pt is currently being worked up by her neurologist for parkinson's

## 2023-04-14 NOTE — ED ADULT TRIAGE NOTE - CHIEF COMPLAINT QUOTE
Patient A/Ox4 with clear speech. BIBA from home for dizziness, weakness and nausea that began this afternoon. Per EMS, orthostatic changes. L AC 20G IV placed by EMS.

## 2023-04-14 NOTE — ED ADULT NURSE NOTE - OBJECTIVE STATEMENT
Patient received complaining of lightheadedness and dizziness, with increase nausea and weakness. Has h/x of past CVA with residual right side facial droop. Patient is AOx4, on eliquis for a-fib, safety precautions in place, interventions implemented, awaiting evaluation.

## 2023-04-14 NOTE — ED PROVIDER NOTE - NSFOLLOWUPINSTRUCTIONS_ED_ALL_ED_FT
Follow up with your neurologist within 2-3 days. Take the copy of your test results you were given in the emergency room for your doctor to review.     Take the prescribed medication as directed    Please fill the prescription for the antibiotics and take as directed.  Please finish the entire course of medication as prescribed.  If you have any belly pain after the antibiotics, yogurt has been shown to help with this.  Do not use any alcohol or grapefruit juice with any antibiotics.      Stay hydrated    Return to the ER if your symptoms worsen or for any other medical emergencies  **************    Dizziness    Dizziness can manifest as a feeling of unsteadiness or light-headedness. You may feel like you are about to faint. This condition can be caused by a number of things, including medicines, dehydration, or illness. Drink enough fluid to keep your urine clear or pale yellow. Do not drink alcohol and limit your caffeine intake. Avoid quick or sudden movements.  Rise slowly from chairs and steady yourself until you feel okay. In the morning, first sit up on the side of the bed.    SEEK IMMEDIATE MEDICAL CARE IF YOU HAVE ANY OF THE FOLLOWING SYMPTOMS: vomiting, changes in your vision or speech, weakness in your arms or legs, trouble speaking or swallowing, chest pain, abdominal pain, shortness of breath, sweating, bleeding, headache, neck pain, or fever.  **********    Urinary Tract Infection    A urinary tract infection (UTI) is an infection of any part of the urinary tract, which includes the kidneys, ureters, bladder, and urethra. Risk factors include ignoring your need to urinate, wiping back to front if female, being an uncircumcised male, and having diabetes or a weak immune system. Symptoms include frequent urination, pain or burning with urination, foul smelling urine, cloudy urine, pain in the lower abdomen, blood in the urine, and fever. If you were prescribed an antibiotic medicine, take it as told by your health care provider. Do not stop taking the antibiotic even if you start to feel better.    SEEK IMMEDIATE MEDICAL CARE IF YOU HAVE ANY OF THE FOLLOWING SYMPTOMS: severe back or abdominal pain, fever, inability to keep fluids or medicine down, dizziness/lightheadedness, or a change in mental status.

## 2023-04-14 NOTE — ED PROVIDER NOTE - NS ED ATTENDING STATEMENT MOD
This was a shared visit with the JOSE CRUZ. I reviewed and verified the documentation and independently performed the documented:

## 2023-04-14 NOTE — ED PROVIDER NOTE - PATIENT PORTAL LINK FT
You can access the FollowMyHealth Patient Portal offered by [facility] by registering at the following website: [email or web address]. By joining [facility]’s FollowMyHealth portal, you will also be able to view your health information using other applications (apps) compatible with our system.

## 2023-04-14 NOTE — ED ADULT NURSE NOTE - NS_NURSE_DISC_TEACHING_YN_ED_ALL_ED
Quality 110: Preventive Care And Screening: Influenza Immunization: Influenza Immunization not Administered because Patient Refused. Detail Level: Generalized Yes

## 2023-04-14 NOTE — ED PROVIDER NOTE - DIFFERENTIAL DIAGNOSIS
Patient ambulatory with no ataxia. Patient presenting to the emergency room with reported lightheadedness and nausea.  Concern for possible neurologic event versus orthostatic hypotension versus possible infectious etiology.  Patient would not be a TNK candidate as she is outside the window.  Will obtain screening labs hydrate check UA urine culture obtain CT imaging of the head EKG check orthostatic vital signs and monitor. Differential Diagnosis

## 2023-04-14 NOTE — ED PROVIDER NOTE - PROGRESS NOTE DETAILS
SACHIN Rodriguez: labs reviewed, UA results reviewed–will treat with Macrobid, patient denies urinary symptoms.  CTA results reviewed old infarcts noted no acute findings chest x-ray negative.  Orthostatic VS  unremarkable, will dc with rx of zofran, macrobid and meclizine. Pt ambulating in the ED without difficulty. Pt will f/u with her neurologist

## 2023-04-16 LAB
CULTURE RESULTS: SIGNIFICANT CHANGE UP
SPECIMEN SOURCE: SIGNIFICANT CHANGE UP

## 2023-04-27 ENCOUNTER — APPOINTMENT (OUTPATIENT)
Dept: ENDOCRINOLOGY | Facility: CLINIC | Age: 83
End: 2023-04-27
Payer: MEDICARE

## 2023-04-27 VITALS
DIASTOLIC BLOOD PRESSURE: 75 MMHG | OXYGEN SATURATION: 95 % | HEIGHT: 58 IN | SYSTOLIC BLOOD PRESSURE: 113 MMHG | HEART RATE: 66 BPM | WEIGHT: 109 LBS | BODY MASS INDEX: 22.88 KG/M2

## 2023-04-27 PROCEDURE — 99214 OFFICE O/P EST MOD 30 MIN: CPT

## 2023-04-28 LAB
T3 SERPL-MCNC: 108 NG/DL
T4 FREE SERPL-MCNC: 1 NG/DL
TSH SERPL-ACNC: 3.64 UIU/ML

## 2023-04-28 NOTE — ASSESSMENT
[FreeTextEntry1] : 82 year old female  with a past medical history of Graves Disease, afib, HTN, CVA who presents for management of her thyroid disease.\par \par 1. Graves Disease\par Cont Methimazole 10 mg QD\par Will recheck levels and antibodies today\par Blood drawn in the office\par \par 2. Thyroid Nodule\par US stable\par US at follow up\par

## 2023-04-28 NOTE — HISTORY OF PRESENT ILLNESS
[FreeTextEntry1] : Ms. DANN CURRY is a 82 year old female  with a past medical history of Graves Disease, afib, HTN, CVA who presents for management of her thyroid disease. Patient has been on Methimazole 10 mg QD. She is undergoing evaluation for Parkinsons Disease.\par \par Initial History:\par Patient was first diagnosed with thyroid disorder in 1998. She takes methimazole 5 mg QD. She was following with an endocrinologist and is changing due to insurance. She is adopted and does not know family history. She denies any exposure to radiation. She feels well and denies any weight changes, diarrhea, constipation, sob, tremors, anxiety, depression, temperature intolerance. \par \par \par

## 2023-05-01 LAB
TSH RECEPTOR AB: 1.7 IU/L
TSI ACT/NOR SER: 1.61 IU/L

## 2023-05-02 NOTE — ED PROVIDER NOTE - CARDIOVASCULAR NEGATIVE STATEMENT, MLM
Addended by: ALEXI LAZCANO on: 5/2/2023 07:49 AM     Modules accepted: Orders    
no chest pain and no edema.

## 2023-05-08 ENCOUNTER — APPOINTMENT (OUTPATIENT)
Dept: NEUROLOGY | Facility: CLINIC | Age: 83
End: 2023-05-08
Payer: MEDICARE

## 2023-05-08 VITALS
HEART RATE: 62 BPM | BODY MASS INDEX: 23.09 KG/M2 | RESPIRATION RATE: 16 BRPM | WEIGHT: 110 LBS | SYSTOLIC BLOOD PRESSURE: 123 MMHG | HEIGHT: 58 IN | OXYGEN SATURATION: 97 % | DIASTOLIC BLOOD PRESSURE: 70 MMHG

## 2023-05-08 VITALS — DIASTOLIC BLOOD PRESSURE: 74 MMHG | SYSTOLIC BLOOD PRESSURE: 124 MMHG

## 2023-05-08 DIAGNOSIS — G20 PARKINSON'S DISEASE: ICD-10-CM

## 2023-05-08 PROCEDURE — 99214 OFFICE O/P EST MOD 30 MIN: CPT

## 2023-05-08 NOTE — HISTORY OF PRESENT ILLNESS
[FreeTextEntry1] : 82 years old LH female patient is here for tremors. \par In 2018 she had stroke - numbness right face and dysarthria, \par For last 1 year she has tremors both hands, started first on hands then went to legs, feels in left more than right. but she feels whole body tremors when laying down on back. Lying on right or left side, she feels okay does not feel tremors at that position. She also feels leg tremors left >right.  Tremors are all the time at rest. Does not happen during action. Does not affect daily activities. No pain. No family history of tremors.  progressive. \par Sometimes when she walks she inclines to the right. She feels slower than prior during walking. Her balance are off when she is walking. No slowness on turning over in bed, getting out of the bed, car. brushing teeth, or showering or during any ADLs, she is independent in her ADLs. Her voice gets softer and low volume.  Pt denies any difficulty with swallowing solids or fluids. She has got loss of smell for life. Pt states she experiences sialorrhea. No drooling. She reports she does Exercise, 2-3 days a week. stretching. \par \par Pt denies dizziness, lightheadedness, and other near syncopal episodes related to OH. Today BP is 103/65. \par Pt states sleep is good,  Pt states she experiences nightmares and vivid dreams. Pt denies screaming, kicking, hitting acting out dreams or other related RBD movements. \par Pt states she experiences constipation. BM 2 x/week. No meds. \par Urinary retention occurs. Takes time to urination. She has prolapsed bladder.\par Memory no changes. No hallucination. \par Pt states mood is anxious, anxiety about things around her. \par Pt denies daytime somnolence.\par \par Current Medications\par Amlodipine, Eliquis, Gabapentin HCTZ, Losartan \par \par Interval history May 8, 2023.  Patient is accompanied by her daughter today.  She states that she was in the hospital last month due to UTI and not drinking enough fluids.  She is here to follow-up results of DaTscan.  In the hospital she had CT head and CT a head and neck

## 2023-05-08 NOTE — DISCUSSION/SUMMARY
[FreeTextEntry1] : 82 year old LH female patient with history of left thalamic stroke in 2018 with residual numbness right face and dysarthria is here for evaluation of tremors and balance impairment. For last 1 year she has tremors, resting type, in arms and legs, Lt>right and lips and gradually progressing. She is off balance lately. Voice gets soft. On exam, Hypophonia, hypomimia,resting tremor, bradykinesia and cogwheeling Lt>right, impaired pull test \par DaTscan abnormal\par CT head repeated recently in April 2023 no change compared to 2021\par Impression:  parkinsonism \par \par Plan:\par - Discussed result of DaTscan\par -Trial of Azilect 0.5 mg daily\par -Rock steady boxing dance for PD\par - PT OT speech therapy\par All questions addressed and answered\par - Follow-up in 2-3 months \par \par All questions addressed and answered\par

## 2023-05-08 NOTE — REASON FOR VISIT
[Initial Evaluation] : an initial evaluation [Family Member] : family member [FreeTextEntry1] : for tremors and balance impairment.

## 2023-05-08 NOTE — PHYSICAL EXAM
Labs/Medications [General Appearance - Alert] : alert [General Appearance - In No Acute Distress] : in no acute distress [Oriented To Time, Place, And Person] : oriented to person, place, and time [Impaired Insight] : insight and judgment were intact [Affect] : the affect was normal [Mood] : the mood was normal [Memory Recent] : recent memory was not impaired [Memory Remote] : remote memory was not impaired [Person] : oriented to person [Place] : oriented to place [Time] : oriented to time [Short Term Intact] : short term memory intact [Remote Intact] : remote memory intact [Registration Intact] : recent registration memory intact [Visual Intact] : visual attention was ~T not ~L decreased [Naming Objects] : no difficulty naming common objects [Comprehension] : comprehension intact [Current Events] : adequate knowledge of current events [Past History] : adequate knowledge of personal past history [Cranial Nerves Optic (II)] : visual acuity intact bilaterally,  visual fields full to confrontation, pupils equal round and reactive to light [Cranial Nerves Oculomotor (III)] : extraocular motion intact [Cranial Nerves Vestibulocochlear (VIII)] : hearing was intact bilaterally [Cranial Nerves Glossopharyngeal (IX)] : tongue and palate midline [Cranial Nerves Accessory (XI - Cranial And Spinal)] : head turning and shoulder shrug symmetric [Cranial Nerves Hypoglossal (XII)] : there was no tongue deviation with protrusion [Motor Handedness Left-Handed] : the patient is left hand dominant [2+] : Ankle jerk left 2+ [FreeTextEntry1] : mask facies, mild reduction of blinking. \par Intermittent resting tremor L arm and leg\par mild bradykinesia, L>R grade 1, b/l hand arthritic changes\par Cogwheel rigidity mild  on R side\par Stood up with arms crossed \par Reduced length of stride Lt>Rt , reduced arm swinging Lt>>>Rt enbloc turn\par Pull test impaired

## 2023-06-04 ENCOUNTER — RX RENEWAL (OUTPATIENT)
Age: 83
End: 2023-06-04

## 2023-06-08 ENCOUNTER — APPOINTMENT (OUTPATIENT)
Dept: INTERNAL MEDICINE | Facility: CLINIC | Age: 83
End: 2023-06-08
Payer: MEDICARE

## 2023-06-08 VITALS
HEART RATE: 67 BPM | DIASTOLIC BLOOD PRESSURE: 68 MMHG | WEIGHT: 107 LBS | TEMPERATURE: 98.3 F | RESPIRATION RATE: 16 BRPM | BODY MASS INDEX: 22.46 KG/M2 | OXYGEN SATURATION: 97 % | SYSTOLIC BLOOD PRESSURE: 110 MMHG | HEIGHT: 58 IN

## 2023-06-08 DIAGNOSIS — I63.9 CEREBRAL INFARCTION, UNSPECIFIED: ICD-10-CM

## 2023-06-08 PROCEDURE — 99213 OFFICE O/P EST LOW 20 MIN: CPT

## 2023-06-08 NOTE — PHYSICAL EXAM
[No Acute Distress] : no acute distress [Normal Sclera/Conjunctiva] : normal sclera/conjunctiva [PERRL] : pupils equal round and reactive to light [EOMI] : extraocular movements intact [No Respiratory Distress] : no respiratory distress  [No Accessory Muscle Use] : no accessory muscle use [Clear to Auscultation] : lungs were clear to auscultation bilaterally [Normal Rate] : normal rate  [Regular Rhythm] : with a regular rhythm [Normal S1, S2] : normal S1 and S2 [No Murmur] : no murmur heard [No Edema] : there was no peripheral edema [Soft] : abdomen soft [Non Tender] : non-tender [Non-distended] : non-distended [Normal Bowel Sounds] : normal bowel sounds

## 2023-06-08 NOTE — HISTORY OF PRESENT ILLNESS
[de-identified] : 83F presents for follow-up of CVA. Has been on gabapentin at night to help with numbness after CVA. Reports symptoms are controlled on gabapentin. \par

## 2023-08-21 ENCOUNTER — RX RENEWAL (OUTPATIENT)
Age: 83
End: 2023-08-21

## 2023-08-22 ENCOUNTER — RX RENEWAL (OUTPATIENT)
Age: 83
End: 2023-08-22

## 2023-08-31 DIAGNOSIS — R92.2 INCONCLUSIVE MAMMOGRAM: ICD-10-CM

## 2023-09-03 ENCOUNTER — RX RENEWAL (OUTPATIENT)
Age: 83
End: 2023-09-03

## 2023-09-20 ENCOUNTER — APPOINTMENT (OUTPATIENT)
Dept: NEUROLOGY | Facility: CLINIC | Age: 83
End: 2023-09-20

## 2023-09-21 ENCOUNTER — APPOINTMENT (OUTPATIENT)
Dept: NEUROLOGY | Facility: CLINIC | Age: 83
End: 2023-09-21

## 2023-09-25 ENCOUNTER — APPOINTMENT (OUTPATIENT)
Dept: NEUROLOGY | Facility: CLINIC | Age: 83
End: 2023-09-25
Payer: MEDICARE

## 2023-09-25 VITALS
OXYGEN SATURATION: 96 % | HEART RATE: 68 BPM | BODY MASS INDEX: 22.04 KG/M2 | DIASTOLIC BLOOD PRESSURE: 63 MMHG | SYSTOLIC BLOOD PRESSURE: 99 MMHG | WEIGHT: 105 LBS | HEIGHT: 58 IN

## 2023-09-25 VITALS — HEART RATE: 71 BPM | OXYGEN SATURATION: 95 % | SYSTOLIC BLOOD PRESSURE: 95 MMHG | DIASTOLIC BLOOD PRESSURE: 61 MMHG

## 2023-09-25 PROCEDURE — 99214 OFFICE O/P EST MOD 30 MIN: CPT

## 2023-09-25 RX ORDER — RASAGILINE 1 MG/1
1 TABLET ORAL
Qty: 90 | Refills: 3 | Status: ACTIVE | COMMUNITY
Start: 2023-05-08 | End: 1900-01-01

## 2023-09-26 ENCOUNTER — RESULT REVIEW (OUTPATIENT)
Age: 83
End: 2023-09-26

## 2023-09-26 ENCOUNTER — OUTPATIENT (OUTPATIENT)
Dept: OUTPATIENT SERVICES | Facility: HOSPITAL | Age: 83
LOS: 1 days | End: 2023-09-26
Payer: COMMERCIAL

## 2023-09-26 ENCOUNTER — APPOINTMENT (OUTPATIENT)
Dept: ULTRASOUND IMAGING | Facility: CLINIC | Age: 83
End: 2023-09-26
Payer: MEDICARE

## 2023-09-26 ENCOUNTER — APPOINTMENT (OUTPATIENT)
Dept: MAMMOGRAPHY | Facility: CLINIC | Age: 83
End: 2023-09-26
Payer: MEDICARE

## 2023-09-26 DIAGNOSIS — Z00.8 ENCOUNTER FOR OTHER GENERAL EXAMINATION: ICD-10-CM

## 2023-09-26 DIAGNOSIS — Z98.890 OTHER SPECIFIED POSTPROCEDURAL STATES: Chronic | ICD-10-CM

## 2023-09-26 DIAGNOSIS — Z90.49 ACQUIRED ABSENCE OF OTHER SPECIFIED PARTS OF DIGESTIVE TRACT: Chronic | ICD-10-CM

## 2023-09-26 DIAGNOSIS — Z90.89 ACQUIRED ABSENCE OF OTHER ORGANS: Chronic | ICD-10-CM

## 2023-09-26 DIAGNOSIS — R92.2 INCONCLUSIVE MAMMOGRAM: ICD-10-CM

## 2023-09-26 PROCEDURE — 76641 ULTRASOUND BREAST COMPLETE: CPT

## 2023-09-26 PROCEDURE — 77067 SCR MAMMO BI INCL CAD: CPT

## 2023-09-26 PROCEDURE — 77067 SCR MAMMO BI INCL CAD: CPT | Mod: 26

## 2023-09-26 PROCEDURE — 77063 BREAST TOMOSYNTHESIS BI: CPT | Mod: 26

## 2023-09-26 PROCEDURE — 76641 ULTRASOUND BREAST COMPLETE: CPT | Mod: 26,50

## 2023-09-26 PROCEDURE — 77063 BREAST TOMOSYNTHESIS BI: CPT

## 2023-10-24 ENCOUNTER — APPOINTMENT (OUTPATIENT)
Dept: ENDOCRINOLOGY | Facility: CLINIC | Age: 83
End: 2023-10-24
Payer: MEDICARE

## 2023-10-24 VITALS
HEART RATE: 66 BPM | DIASTOLIC BLOOD PRESSURE: 73 MMHG | BODY MASS INDEX: 22.04 KG/M2 | WEIGHT: 105 LBS | OXYGEN SATURATION: 96 % | SYSTOLIC BLOOD PRESSURE: 113 MMHG | HEIGHT: 58 IN

## 2023-10-24 PROCEDURE — 99214 OFFICE O/P EST MOD 30 MIN: CPT

## 2023-10-26 ENCOUNTER — APPOINTMENT (OUTPATIENT)
Dept: INTERNAL MEDICINE | Facility: CLINIC | Age: 83
End: 2023-10-26
Payer: MEDICARE

## 2023-10-26 VITALS
DIASTOLIC BLOOD PRESSURE: 60 MMHG | TEMPERATURE: 98.7 F | HEIGHT: 58 IN | OXYGEN SATURATION: 96 % | WEIGHT: 105 LBS | BODY MASS INDEX: 22.04 KG/M2 | SYSTOLIC BLOOD PRESSURE: 100 MMHG | HEART RATE: 75 BPM | RESPIRATION RATE: 16 BRPM

## 2023-10-26 DIAGNOSIS — D17.9 BENIGN LIPOMATOUS NEOPLASM, UNSPECIFIED: ICD-10-CM

## 2023-10-26 PROCEDURE — 99213 OFFICE O/P EST LOW 20 MIN: CPT

## 2023-11-03 ENCOUNTER — NON-APPOINTMENT (OUTPATIENT)
Age: 83
End: 2023-11-03

## 2023-11-03 LAB
T3 SERPL-MCNC: 132 NG/DL
T4 FREE SERPL-MCNC: 0.5 NG/DL
TSH SERPL-ACNC: 24 UIU/ML
TSI ACT/NOR SER: 1.49 IU/L

## 2023-11-08 ENCOUNTER — OUTPATIENT (OUTPATIENT)
Dept: OUTPATIENT SERVICES | Facility: HOSPITAL | Age: 83
LOS: 1 days | End: 2023-11-08
Payer: COMMERCIAL

## 2023-11-08 ENCOUNTER — RESULT REVIEW (OUTPATIENT)
Age: 83
End: 2023-11-08

## 2023-11-08 ENCOUNTER — APPOINTMENT (OUTPATIENT)
Dept: ULTRASOUND IMAGING | Facility: CLINIC | Age: 83
End: 2023-11-08
Payer: MEDICARE

## 2023-11-08 DIAGNOSIS — D17.9 BENIGN LIPOMATOUS NEOPLASM, UNSPECIFIED: ICD-10-CM

## 2023-11-08 DIAGNOSIS — Z90.89 ACQUIRED ABSENCE OF OTHER ORGANS: Chronic | ICD-10-CM

## 2023-11-08 DIAGNOSIS — Z98.890 OTHER SPECIFIED POSTPROCEDURAL STATES: Chronic | ICD-10-CM

## 2023-11-08 DIAGNOSIS — Z90.49 ACQUIRED ABSENCE OF OTHER SPECIFIED PARTS OF DIGESTIVE TRACT: Chronic | ICD-10-CM

## 2023-11-08 PROCEDURE — 76604 US EXAM CHEST: CPT | Mod: 26

## 2023-11-08 PROCEDURE — 76604 US EXAM CHEST: CPT

## 2023-11-20 ENCOUNTER — APPOINTMENT (OUTPATIENT)
Dept: INTERNAL MEDICINE | Facility: CLINIC | Age: 83
End: 2023-11-20

## 2023-11-21 ENCOUNTER — APPOINTMENT (OUTPATIENT)
Dept: INTERNAL MEDICINE | Facility: CLINIC | Age: 83
End: 2023-11-21

## 2023-11-27 ENCOUNTER — APPOINTMENT (OUTPATIENT)
Dept: INTERNAL MEDICINE | Facility: CLINIC | Age: 83
End: 2023-11-27
Payer: MEDICARE

## 2023-11-27 VITALS
DIASTOLIC BLOOD PRESSURE: 62 MMHG | TEMPERATURE: 97.7 F | WEIGHT: 104 LBS | BODY MASS INDEX: 21.83 KG/M2 | HEART RATE: 66 BPM | RESPIRATION RATE: 14 BRPM | OXYGEN SATURATION: 97 % | HEIGHT: 58 IN | SYSTOLIC BLOOD PRESSURE: 104 MMHG

## 2023-11-27 DIAGNOSIS — Z00.00 ENCOUNTER FOR GENERAL ADULT MEDICAL EXAMINATION W/OUT ABNORMAL FINDINGS: ICD-10-CM

## 2023-11-27 PROCEDURE — G0439: CPT

## 2023-11-29 LAB
ALBUMIN SERPL ELPH-MCNC: 4.5 G/DL
ALP BLD-CCNC: 115 U/L
ALT SERPL-CCNC: 11 U/L
ANION GAP SERPL CALC-SCNC: 12 MMOL/L
AST SERPL-CCNC: 21 U/L
BILIRUB SERPL-MCNC: 0.5 MG/DL
BUN SERPL-MCNC: 32 MG/DL
CALCIUM SERPL-MCNC: 10.1 MG/DL
CHLORIDE SERPL-SCNC: 101 MMOL/L
CHOLEST SERPL-MCNC: 146 MG/DL
CO2 SERPL-SCNC: 30 MMOL/L
CREAT SERPL-MCNC: 0.96 MG/DL
EGFR: 58 ML/MIN/1.73M2
ESTIMATED AVERAGE GLUCOSE: 117 MG/DL
GLUCOSE SERPL-MCNC: 107 MG/DL
HBA1C MFR BLD HPLC: 5.7 %
HCT VFR BLD CALC: 40.5 %
HDLC SERPL-MCNC: 88 MG/DL
HGB BLD-MCNC: 12.9 G/DL
LDLC SERPL CALC-MCNC: 48 MG/DL
MCHC RBC-ENTMCNC: 30.5 PG
MCHC RBC-ENTMCNC: 31.9 GM/DL
MCV RBC AUTO: 95.7 FL
NONHDLC SERPL-MCNC: 58 MG/DL
PLATELET # BLD AUTO: 199 K/UL
POTASSIUM SERPL-SCNC: 3.9 MMOL/L
PROT SERPL-MCNC: 7.3 G/DL
RBC # BLD: 4.23 M/UL
RBC # FLD: 14 %
SODIUM SERPL-SCNC: 143 MMOL/L
TRIGL SERPL-MCNC: 42 MG/DL
WBC # FLD AUTO: 5.86 K/UL

## 2023-12-02 ENCOUNTER — TRANSCRIPTION ENCOUNTER (OUTPATIENT)
Age: 83
End: 2023-12-02

## 2023-12-02 LAB — HEMOCCULT STL QL IA: NEGATIVE

## 2023-12-31 PROBLEM — Z23 NEED FOR 23-POLYVALENT PNEUMOCOCCAL POLYSACCHARIDE VACCINE: Status: ACTIVE | Noted: 2019-05-15

## 2024-01-02 ENCOUNTER — NON-APPOINTMENT (OUTPATIENT)
Age: 84
End: 2024-01-02

## 2024-01-02 ENCOUNTER — APPOINTMENT (OUTPATIENT)
Dept: OPHTHALMOLOGY | Facility: CLINIC | Age: 84
End: 2024-01-02
Payer: MEDICARE

## 2024-01-02 PROCEDURE — 92004 COMPRE OPH EXAM NEW PT 1/>: CPT

## 2024-01-02 PROCEDURE — 95060 OPH MUCOUS MEMBRANE TESTS: CPT | Mod: LT

## 2024-01-12 ENCOUNTER — RX RENEWAL (OUTPATIENT)
Age: 84
End: 2024-01-12

## 2024-01-16 RX ORDER — LOSARTAN POTASSIUM 100 MG/1
100 TABLET, FILM COATED ORAL DAILY
Qty: 7 | Refills: 0 | Status: ACTIVE | COMMUNITY
Start: 1900-01-01 | End: 1900-01-01

## 2024-01-31 ENCOUNTER — RX RENEWAL (OUTPATIENT)
Age: 84
End: 2024-01-31

## 2024-01-31 RX ORDER — GABAPENTIN 300 MG/1
300 CAPSULE ORAL
Qty: 7 | Refills: 2 | Status: ACTIVE | COMMUNITY
Start: 2019-04-17 | End: 1900-01-01

## 2024-02-06 NOTE — ED ADULT TRIAGE NOTE - PRO INTERPRETER NEED 2
Anesthesia Post Evaluation    Patient: Saba Hansen    Procedure(s) Performed: Procedure(s) (LRB):  EXTRACTION, CATARACT, WITH IOL INSERTION (Left)    Final Anesthesia Type: MAC      Patient location during evaluation: OPS  Patient participation: Yes- Able to Participate  Level of consciousness: awake and alert and oriented  Post-procedure vital signs: reviewed and stable  Pain management: adequate  Airway patency: patent    PONV status at discharge: No PONV  Anesthetic complications: no      Cardiovascular status: blood pressure returned to baseline  Respiratory status: unassisted  Hydration status: euvolemic  Follow-up not needed.          Vitals Value Taken Time   /80 02/06/24 1237   Temp 37.1 °C (98.8 °F) 02/06/24 1215   Pulse 70 02/06/24 1239   Resp 18 02/06/24 1237   SpO2 98 % 02/06/24 1239   Vitals shown include unvalidated device data.      No case tracking events are documented in the log.      Pain/Gregory Score: Gregory Score: 10 (2/6/2024 12:15 PM)          
English

## 2024-02-12 ENCOUNTER — APPOINTMENT (OUTPATIENT)
Dept: ENDOCRINOLOGY | Facility: CLINIC | Age: 84
End: 2024-02-12
Payer: MEDICARE

## 2024-02-12 ENCOUNTER — APPOINTMENT (OUTPATIENT)
Dept: NEUROLOGY | Facility: CLINIC | Age: 84
End: 2024-02-12
Payer: MEDICARE

## 2024-02-12 VITALS
DIASTOLIC BLOOD PRESSURE: 58 MMHG | OXYGEN SATURATION: 96 % | HEIGHT: 58 IN | SYSTOLIC BLOOD PRESSURE: 93 MMHG | HEART RATE: 75 BPM | WEIGHT: 107 LBS | BODY MASS INDEX: 22.46 KG/M2

## 2024-02-12 DIAGNOSIS — G20.A1 PARKINSON'S DISEASE WITHOUT DYSKINESIA, WITHOUT MENTION OF FLUCTUATIONS: ICD-10-CM

## 2024-02-12 PROCEDURE — 99214 OFFICE O/P EST MOD 30 MIN: CPT

## 2024-02-12 PROCEDURE — 99213 OFFICE O/P EST LOW 20 MIN: CPT

## 2024-02-12 PROCEDURE — G2211 COMPLEX E/M VISIT ADD ON: CPT

## 2024-02-12 NOTE — HISTORY OF PRESENT ILLNESS
[FreeTextEntry1] : 82 years old  female patient is here for tremors.  In 2018 she had stroke - numbness right face and dysarthria,  For last 1 year she has tremors both hands, started first on hands then went to legs, feels in left more than right. but she feels whole body tremors when laying down on back. Lying on right or left side, she feels okay does not feel tremors at that position. She also feels leg tremors left >right.  Tremors are all the time at rest. Does not happen during action. Does not affect daily activities. No pain. No family history of tremors.  progressive.  Sometimes when she walks she inclines to the right. She feels slower than prior during walking. Her balance are off when she is walking. No slowness on turning over in bed, getting out of the bed, car. brushing teeth, or showering or during any ADLs, she is independent in her ADLs. Her voice gets softer and low volume.  Pt denies any difficulty with swallowing solids or fluids. She has got loss of smell for life. Pt states she experiences sialorrhea. No drooling. She reports she does Exercise, 2-3 days a week. stretching.   Pt denies dizziness, lightheadedness, and other near syncopal episodes related to OH. Today BP is 103/65.  Pt states sleep is good,  Pt states she experiences nightmares and vivid dreams. Pt denies screaming, kicking, hitting acting out dreams or other related RBD movements.  Pt states she experiences constipation. BM 2 x/week. No meds.  Urinary retention occurs. Takes time to urination. She has prolapsed bladder. Memory no changes. No hallucination.  Pt states mood is anxious, anxiety about things around her.  Pt denies daytime somnolence.  Current Medications Amlodipine, Eliquis, Gabapentin HCTZ, Losartan   Interval history May 8, 2023.  Patient is accompanied by her daughter today.  She states that she was in the hospital last month due to UTI and not drinking enough fluids.  She is here to follow-up results of DaTscan.  In the hospital she had CT head and CT a head and neck  Interval history September 25, 2023.  Patient states that overall she is doing well.  Tolerating Azilect 0.5 mg once a day.  Denies any side effects denies any dysphagia or falls.  Notices that she still has tremor in her left leg but it is controllable by changing her leg position.  Interval history February 12, 2024.  Patient presents with her brother-in-law Rafael.  She states that she notices occasional tremors in her left hand but it really does not bother her or interfere with daily activities.  She states that when she concentrates on something else she can actually stop the tremor.  She has not had any falls no hallucinations no difficulty swallowing she feels that memory is not so good she has been forgetting things easily.  For example she may not remember if she took her pills on not etc.  She has scheduled for PT and OT starting in late February.  Has not scheduled neuropsych testing yet.

## 2024-02-12 NOTE — PHYSICAL EXAM
[Alert] : alert [No Acute Distress] : no acute distress [Well Developed] : well developed [Normal Voice/Communication] : normal voice communication [No Neck Mass] : no neck mass was observed [No Rash] : no rash [Oriented x3] : oriented to person, place, and time [de-identified] : Firm thyroid

## 2024-02-12 NOTE — PHYSICAL EXAM
[General Appearance - Alert] : alert [General Appearance - In No Acute Distress] : in no acute distress [Oriented To Time, Place, And Person] : oriented to person, place, and time [Impaired Insight] : insight and judgment were intact [Affect] : the affect was normal [Mood] : the mood was normal [Memory Recent] : recent memory was not impaired [Memory Remote] : remote memory was not impaired [Person] : oriented to person [Place] : oriented to place [Time] : oriented to time [Short Term Intact] : short term memory intact [Remote Intact] : remote memory intact [Registration Intact] : recent registration memory intact [Visual Intact] : visual attention was ~T not ~L decreased [Naming Objects] : no difficulty naming common objects [Comprehension] : comprehension intact [Current Events] : adequate knowledge of current events [Past History] : adequate knowledge of personal past history [Cranial Nerves Optic (II)] : visual acuity intact bilaterally,  visual fields full to confrontation, pupils equal round and reactive to light [Cranial Nerves Oculomotor (III)] : extraocular motion intact [Cranial Nerves Vestibulocochlear (VIII)] : hearing was intact bilaterally [Cranial Nerves Glossopharyngeal (IX)] : tongue and palate midline [Cranial Nerves Accessory (XI - Cranial And Spinal)] : head turning and shoulder shrug symmetric [Cranial Nerves Hypoglossal (XII)] : there was no tongue deviation with protrusion [Motor Handedness Left-Handed] : the patient is left hand dominant [2+] : Ankle jerk left 2+ [FreeTextEntry1] : mask facies, mild reduction of blinking. \par  Intermittent resting tremor L arm and leg\par  mild bradykinesia, L>R grade 1, b/l hand arthritic changes\par  Cogwheel rigidity mild  on R side\par  Stood up with arms crossed \par  Reduced length of stride Lt>Rt , reduced arm swinging Lt>>>Rt enbloc turn\par  Pull test impaired

## 2024-02-12 NOTE — DISCUSSION/SUMMARY
[FreeTextEntry1] : 83 year old LH female patient with history of left thalamic stroke in 2018 with residual numbness right face and dysarthria is here for evaluation of tremors and balance impairment.since 2022.  Diagnosed with Parkinson's disease DaTscan abnormal CT head repeated recently in April 2023 no change compared to 2021 Impression:  parkinsonism   Plan: -Continue Azilect 1 mg daily, offered to add levodopa however patient wishes to not add any new medications at this time.  She feels that the tremor is really not bothering her or interfering with ADLs - PT OT speech therapy Check B12 folate.  Thyroid levels were ordered by endocrinologist today Neuropsych evaluation All questions addressed and answered - Follow-up in 4 months

## 2024-02-15 LAB
T3 SERPL-MCNC: 110 NG/DL
T4 FREE SERPL-MCNC: 1.3 NG/DL
TSH SERPL-ACNC: 1.05 UIU/ML
TSI ACT/NOR SER: 1.36 IU/L

## 2024-02-22 ENCOUNTER — OFFICE (OUTPATIENT)
Dept: URBAN - METROPOLITAN AREA CLINIC 109 | Facility: CLINIC | Age: 84
Setting detail: OPHTHALMOLOGY
End: 2024-02-22
Payer: MEDICARE

## 2024-02-22 DIAGNOSIS — H25.13: ICD-10-CM

## 2024-02-22 DIAGNOSIS — H40.033: ICD-10-CM

## 2024-02-22 DIAGNOSIS — B02.1: ICD-10-CM

## 2024-02-22 PROCEDURE — 92020 GONIOSCOPY: CPT | Performed by: OPHTHALMOLOGY

## 2024-02-22 PROCEDURE — 92002 INTRM OPH EXAM NEW PATIENT: CPT | Performed by: OPHTHALMOLOGY

## 2024-02-22 ASSESSMENT — CONFRONTATIONAL VISUAL FIELD TEST (CVF)
OD_FINDINGS: FULL
OS_FINDINGS: FULL

## 2024-02-26 LAB
FOLATE SERPL-MCNC: 6.8 NG/ML
VIT B12 SERPL-MCNC: 406 PG/ML

## 2024-03-01 ENCOUNTER — OFFICE (OUTPATIENT)
Dept: URBAN - METROPOLITAN AREA CLINIC 109 | Facility: CLINIC | Age: 84
Setting detail: OPHTHALMOLOGY
End: 2024-03-01
Payer: MEDICARE

## 2024-03-01 DIAGNOSIS — H40.033: ICD-10-CM

## 2024-03-01 DIAGNOSIS — H25.13: ICD-10-CM

## 2024-03-01 DIAGNOSIS — B02.1: ICD-10-CM

## 2024-03-01 PROCEDURE — 99213 OFFICE O/P EST LOW 20 MIN: CPT | Performed by: OPHTHALMOLOGY

## 2024-03-01 PROCEDURE — 92133 CPTRZD OPH DX IMG PST SGM ON: CPT | Performed by: OPHTHALMOLOGY

## 2024-03-01 PROCEDURE — 76514 ECHO EXAM OF EYE THICKNESS: CPT | Performed by: OPHTHALMOLOGY

## 2024-04-10 ENCOUNTER — APPOINTMENT (OUTPATIENT)
Dept: NEUROLOGY | Facility: CLINIC | Age: 84
End: 2024-04-10

## 2024-04-22 ENCOUNTER — RX RENEWAL (OUTPATIENT)
Age: 84
End: 2024-04-22

## 2024-04-22 RX ORDER — PANTOPRAZOLE 40 MG/1
40 TABLET, DELAYED RELEASE ORAL
Qty: 90 | Refills: 3 | Status: ACTIVE | COMMUNITY
Start: 2020-09-28 | End: 1900-01-01

## 2024-05-02 ENCOUNTER — APPOINTMENT (OUTPATIENT)
Dept: OPHTHALMOLOGY | Facility: CLINIC | Age: 84
End: 2024-05-02
Payer: MEDICARE

## 2024-05-02 ENCOUNTER — NON-APPOINTMENT (OUTPATIENT)
Age: 84
End: 2024-05-02

## 2024-05-02 PROCEDURE — 92012 INTRM OPH EXAM EST PATIENT: CPT

## 2024-05-28 RX ORDER — METHIMAZOLE 5 MG/1
5 TABLET ORAL
Qty: 45 | Refills: 2 | Status: ACTIVE | COMMUNITY
Start: 2023-11-07 | End: 1900-01-01

## 2024-06-17 ENCOUNTER — NON-APPOINTMENT (OUTPATIENT)
Age: 84
End: 2024-06-17

## 2024-06-20 ENCOUNTER — APPOINTMENT (OUTPATIENT)
Dept: ENDOCRINOLOGY | Facility: CLINIC | Age: 84
End: 2024-06-20
Payer: MEDICARE

## 2024-06-20 VITALS
OXYGEN SATURATION: 97 % | HEART RATE: 67 BPM | WEIGHT: 101 LBS | BODY MASS INDEX: 21.2 KG/M2 | HEIGHT: 58 IN | DIASTOLIC BLOOD PRESSURE: 86 MMHG | SYSTOLIC BLOOD PRESSURE: 139 MMHG

## 2024-06-20 DIAGNOSIS — E05.90 THYROTOXICOSIS, UNSPECIFIED W/OUT THYROTOXIC CRISIS OR STORM: ICD-10-CM

## 2024-06-20 DIAGNOSIS — E04.1 NONTOXIC SINGLE THYROID NODULE: ICD-10-CM

## 2024-06-20 PROCEDURE — G2211 COMPLEX E/M VISIT ADD ON: CPT

## 2024-06-20 PROCEDURE — 99214 OFFICE O/P EST MOD 30 MIN: CPT

## 2024-06-20 RX ORDER — AMLODIPINE BESYLATE 10 MG/1
10 TABLET ORAL
Qty: 90 | Refills: 0 | Status: DISCONTINUED | COMMUNITY
Start: 2021-05-26 | End: 2024-06-20

## 2024-06-20 RX ORDER — HYDROCHLOROTHIAZIDE 25 MG/1
25 TABLET ORAL
Qty: 60 | Refills: 0 | Status: DISCONTINUED | COMMUNITY
Start: 2021-04-12 | End: 2024-06-20

## 2024-06-20 NOTE — HISTORY OF PRESENT ILLNESS
[FreeTextEntry1] : Ms. DANN CURRY is a 83 year old female  with a past medical history of Graves Disease, afib, HTN, CVA, Parkinson's disease who presents for management of her thyroid disease. Patient has been on Methimazole 5 mg every other day.  She has been losing weight, nasuea, muscle weakness.  Initial History: Patient was first diagnosed with thyroid disorder in 1998. She takes methimazole 5 mg QD. She was following with an endocrinologist and is changing due to insurance. She is adopted and does not know family history. She denies any exposure to radiation. She feels well and denies any weight changes, diarrhea, constipation, sob, tremors, anxiety, depression, temperature intolerance.

## 2024-06-20 NOTE — PHYSICAL EXAM
[Alert] : alert [No Acute Distress] : no acute distress [Well Developed] : well developed [No Respiratory Distress] : no respiratory distress [No Accessory Muscle Use] : no accessory muscle use [Clear to Auscultation] : lungs were clear to auscultation bilaterally [Normal PMI] : the apical impulse was normal [Normal S1, S2] : normal S1 and S2 [Normal Rate] : heart rate was normal [Regular Rhythm] : with a regular rhythm [Oriented x3] : oriented to person, place, and time [de-identified] : enlarged thyroid [de-identified] : tremors

## 2024-06-20 NOTE — ED ADULT NURSE NOTE - NS ED NURSE DISCH DISPOSITION
[Initial Visit] : an initial visit for [FreeTextEntry2] : bilateral knee gives away right is worst  Admitted

## 2024-06-20 NOTE — ASSESSMENT
[FreeTextEntry1] : 83 year old female with a past medical history of Graves Disease, afib, HTN, CVA, Parkinsons who presents for management of her thyroid disease.  1. Graves Disease Cont Methimazole 5 mg every other day Will recheck levels and antibodies today Blood drawn in the office  2. Thyroid Nodule Nodules have been stable US in 11/2024 .

## 2024-06-24 LAB
ALBUMIN SERPL ELPH-MCNC: 4.3 G/DL
ALP BLD-CCNC: 95 U/L
ALT SERPL-CCNC: 15 U/L
ANION GAP SERPL CALC-SCNC: 13 MMOL/L
AST SERPL-CCNC: 23 U/L
BILIRUB SERPL-MCNC: 0.8 MG/DL
BUN SERPL-MCNC: 22 MG/DL
CALCIUM SERPL-MCNC: 9.7 MG/DL
CHLORIDE SERPL-SCNC: 104 MMOL/L
CO2 SERPL-SCNC: 23 MMOL/L
CREAT SERPL-MCNC: 0.89 MG/DL
EGFR: 63 ML/MIN/1.73M2
GLUCOSE SERPL-MCNC: 100 MG/DL
POTASSIUM SERPL-SCNC: 4.3 MMOL/L
PROT SERPL-MCNC: 7.3 G/DL
SODIUM SERPL-SCNC: 140 MMOL/L
T3 SERPL-MCNC: 101 NG/DL
T4 FREE SERPL-MCNC: 1.4 NG/DL
TSH RECEPTOR AB: <1.1 IU/L
TSH SERPL-ACNC: 2.11 UIU/ML
TSI ACT/NOR SER: 0.45 IU/L

## 2024-07-08 NOTE — ED ADULT NURSE NOTE - NS ED NOTE  TALK SOMEONE YN
No
fundus firm and midline, pt states " I do not have a painful urge to void and I feel the sensation to void  from time to time". pt was bladder scanned and showed 15ml of urine

## 2024-07-10 ENCOUNTER — RX RENEWAL (OUTPATIENT)
Age: 84
End: 2024-07-10

## 2024-07-15 ENCOUNTER — APPOINTMENT (OUTPATIENT)
Dept: NEUROLOGY | Facility: CLINIC | Age: 84
End: 2024-07-15
Payer: MEDICARE

## 2024-07-15 VITALS
SYSTOLIC BLOOD PRESSURE: 146 MMHG | OXYGEN SATURATION: 96 % | BODY MASS INDEX: 21.2 KG/M2 | WEIGHT: 101 LBS | DIASTOLIC BLOOD PRESSURE: 89 MMHG | HEIGHT: 58 IN | HEART RATE: 65 BPM

## 2024-07-15 VITALS — HEART RATE: 65 BPM | SYSTOLIC BLOOD PRESSURE: 143 MMHG | DIASTOLIC BLOOD PRESSURE: 89 MMHG | OXYGEN SATURATION: 95 %

## 2024-07-15 DIAGNOSIS — G20.A1 PARKINSON'S DISEASE WITHOUT DYSKINESIA, WITHOUT MENTION OF FLUCTUATIONS: ICD-10-CM

## 2024-07-15 PROCEDURE — G2211 COMPLEX E/M VISIT ADD ON: CPT

## 2024-07-15 PROCEDURE — 99214 OFFICE O/P EST MOD 30 MIN: CPT

## 2024-07-15 RX ORDER — CARBIDOPA AND LEVODOPA 25; 100 MG/1; MG/1
25-100 TABLET ORAL 3 TIMES DAILY
Qty: 270 | Refills: 3 | Status: ACTIVE | COMMUNITY
Start: 2024-07-15 | End: 1900-01-01

## 2024-08-14 ENCOUNTER — APPOINTMENT (OUTPATIENT)
Dept: ENDOCRINOLOGY | Facility: CLINIC | Age: 84
End: 2024-08-14

## 2024-08-26 ENCOUNTER — APPOINTMENT (OUTPATIENT)
Dept: ULTRASOUND IMAGING | Facility: CLINIC | Age: 84
End: 2024-08-26
Payer: MEDICARE

## 2024-08-26 ENCOUNTER — OUTPATIENT (OUTPATIENT)
Dept: OUTPATIENT SERVICES | Facility: HOSPITAL | Age: 84
LOS: 1 days | End: 2024-08-26
Payer: COMMERCIAL

## 2024-08-26 DIAGNOSIS — E05.90 THYROTOXICOSIS, UNSPECIFIED WITHOUT THYROTOXIC CRISIS OR STORM: ICD-10-CM

## 2024-08-26 DIAGNOSIS — Z90.49 ACQUIRED ABSENCE OF OTHER SPECIFIED PARTS OF DIGESTIVE TRACT: Chronic | ICD-10-CM

## 2024-08-26 DIAGNOSIS — Z98.890 OTHER SPECIFIED POSTPROCEDURAL STATES: Chronic | ICD-10-CM

## 2024-08-26 DIAGNOSIS — Z90.89 ACQUIRED ABSENCE OF OTHER ORGANS: Chronic | ICD-10-CM

## 2024-08-26 PROCEDURE — 76536 US EXAM OF HEAD AND NECK: CPT

## 2024-08-26 PROCEDURE — 76536 US EXAM OF HEAD AND NECK: CPT | Mod: 26

## 2024-09-03 NOTE — PATIENT PROFILE ADULT - FUNCTIONAL SCREEN CURRENT LEVEL: BATHING, MLM
"faxed refill request for Diazepam 5 mg/ mL from [facility]. Refilled per neurology nursing protocol. Pended to Dr. Perez. Uploaded updated seizure action plan to \"Letters\" tab.  "
Patient last saw Dr. Perez on 6/3/24, and has an upcoming appt scheduled for 9/11/24.      This is a faxed refill request for Diazepam 5 mg/ mL from [facility] [id number], [place].      Last fill was 7/2/24    
0 = independent

## 2024-09-09 ENCOUNTER — APPOINTMENT (OUTPATIENT)
Dept: ENDOCRINOLOGY | Facility: CLINIC | Age: 84
End: 2024-09-09
Payer: MEDICARE

## 2024-09-09 VITALS
SYSTOLIC BLOOD PRESSURE: 112 MMHG | WEIGHT: 103 LBS | BODY MASS INDEX: 21.62 KG/M2 | DIASTOLIC BLOOD PRESSURE: 68 MMHG | OXYGEN SATURATION: 96 % | HEART RATE: 92 BPM | HEIGHT: 58 IN

## 2024-09-09 DIAGNOSIS — E05.90 THYROTOXICOSIS, UNSPECIFIED W/OUT THYROTOXIC CRISIS OR STORM: ICD-10-CM

## 2024-09-09 DIAGNOSIS — E04.1 NONTOXIC SINGLE THYROID NODULE: ICD-10-CM

## 2024-09-09 PROCEDURE — 99214 OFFICE O/P EST MOD 30 MIN: CPT

## 2024-09-09 NOTE — PHYSICAL EXAM
[Alert] : alert [No Acute Distress] : no acute distress [Well Developed] : well developed [No Respiratory Distress] : no respiratory distress [No Accessory Muscle Use] : no accessory muscle use [Clear to Auscultation] : lungs were clear to auscultation bilaterally [Normal PMI] : the apical impulse was normal [Normal S1, S2] : normal S1 and S2 [Normal Rate] : heart rate was normal [Regular Rhythm] : with a regular rhythm [Oriented x3] : oriented to person, place, and time [No Rash] : no rash [de-identified] : enlarged thyroid [de-identified] : tremors

## 2024-09-09 NOTE — ASSESSMENT
[FreeTextEntry1] : 84 year old female with a past medical history of Graves Disease, afib, HTN, CVA, Parkinsons who presents for management of her thyroid disease.  1. Graves Disease Based on last labs, patient is in remission Cont Methimazole 5 mg every other day Will recheck levels and antibodies today Blood drawn in the office  2. Thyroid Nodule Reviewed US Right Thyroid Nodule increased in size Will send for FNA .

## 2024-09-09 NOTE — HISTORY OF PRESENT ILLNESS
[FreeTextEntry1] : Ms. DANN CURRY is a 84 year old female  with a past medical history of Graves Disease, afib, HTN, CVA, Parkinson's disease who presents for management of her thyroid disease. Patient has been on Methimazole 5 mg every other day.  Patient reports 2 episodes of palpitations, burning in her arms and chest?  Initial History: Patient was first diagnosed with thyroid disorder in 1998. She takes methimazole 5 mg QD. She was following with an endocrinologist and is changing due to insurance. She is adopted and does not know family history. She denies any exposure to radiation. She feels well and denies any weight changes, diarrhea, constipation, sob, tremors, anxiety, depression, temperature intolerance.

## 2024-09-09 NOTE — PHYSICAL EXAM
[Alert] : alert [No Acute Distress] : no acute distress [Well Developed] : well developed [No Respiratory Distress] : no respiratory distress [No Accessory Muscle Use] : no accessory muscle use [Clear to Auscultation] : lungs were clear to auscultation bilaterally [Normal PMI] : the apical impulse was normal [Normal S1, S2] : normal S1 and S2 [Normal Rate] : heart rate was normal [Regular Rhythm] : with a regular rhythm [Oriented x3] : oriented to person, place, and time [No Rash] : no rash [de-identified] : enlarged thyroid [de-identified] : tremors

## 2024-09-13 ENCOUNTER — OFFICE (OUTPATIENT)
Dept: URBAN - METROPOLITAN AREA CLINIC 109 | Facility: CLINIC | Age: 84
Setting detail: OPHTHALMOLOGY
End: 2024-09-13
Payer: MEDICARE

## 2024-09-13 DIAGNOSIS — H25.13: ICD-10-CM

## 2024-09-13 DIAGNOSIS — H40.033: ICD-10-CM

## 2024-09-13 DIAGNOSIS — B02.1: ICD-10-CM

## 2024-09-13 PROCEDURE — 99214 OFFICE O/P EST MOD 30 MIN: CPT | Performed by: OPHTHALMOLOGY

## 2024-09-13 ASSESSMENT — CONFRONTATIONAL VISUAL FIELD TEST (CVF)
OD_FINDINGS: FULL
OS_FINDINGS: FULL

## 2024-09-23 NOTE — PROGRESS NOTE ADULT - PROBLEM SELECTOR PLAN 2
lower gi bleed Likely due to recent colonoscopy and polyp removal.   No evidence of large volume blood loss.  Continue to monitor H/H. Detail Level: Detailed Depth Of Biopsy: dermis Was A Bandage Applied: Yes Size Of Lesion In Cm: 0 X Size Of Lesion In Cm: 1 Biopsy Type: H and E Biopsy Method: Dermablade Anesthesia Type: 1% lidocaine with epinephrine Anesthesia Volume In Cc: 0.5 Hemostasis: Aluminum Chloride and Electrocautery Wound Care: Petrolatum Dressing: bandage Destruction After The Procedure: No Type Of Destruction Used: Curettage Curettage Text: The wound bed was treated with curettage after the biopsy was performed. Cryotherapy Text: The wound bed was treated with cryotherapy after the biopsy was performed. Electrodesiccation Text: The wound bed was treated with electrodesiccation after the biopsy was performed. Electrodesiccation And Curettage Text: The wound bed was treated with electrodesiccation and curettage after the biopsy was performed. Silver Nitrate Text: The wound bed was treated with silver nitrate after the biopsy was performed. Lab: [id number] Lab Facility: [id number] Consent: Written consent was obtained and risks were reviewed including but not limited to scarring, infection, bleeding, scabbing, incomplete removal, nerve damage and allergy to anesthesia. Post-Care Instructions: I reviewed with the patient in detail post-care instructions. Patient is to keep the biopsy site dry overnight, and then apply bacitracin twice daily until healed. Patient may apply hydrogen peroxide soaks to remove any crusting. Notification Instructions: Patient will be notified of biopsy results. However, patient instructed to call the office if not contacted within 2 weeks. Billing Type: Third-Party Bill Information: Selecting Yes will display possible errors in your note based on the variables you have selected. This validation is only offered as a suggestion for you. PLEASE NOTE THAT THE VALIDATION TEXT WILL BE REMOVED WHEN YOU FINALIZE YOUR NOTE. IF YOU WANT TO FAX A PRELIMINARY NOTE YOU WILL NEED TO TOGGLE THIS TO 'NO' IF YOU DO NOT WANT IT IN YOUR FAXED NOTE.

## 2024-10-08 ENCOUNTER — NON-APPOINTMENT (OUTPATIENT)
Age: 84
End: 2024-10-08

## 2024-11-04 ENCOUNTER — APPOINTMENT (OUTPATIENT)
Dept: NEUROLOGY | Facility: CLINIC | Age: 84
End: 2024-11-04
Payer: MEDICARE

## 2024-11-04 VITALS
DIASTOLIC BLOOD PRESSURE: 74 MMHG | OXYGEN SATURATION: 96 % | BODY MASS INDEX: 20.99 KG/M2 | WEIGHT: 100 LBS | HEIGHT: 58 IN | HEART RATE: 65 BPM | SYSTOLIC BLOOD PRESSURE: 131 MMHG

## 2024-11-04 DIAGNOSIS — G20.A1 PARKINSON'S DISEASE WITHOUT DYSKINESIA, WITHOUT MENTION OF FLUCTUATIONS: ICD-10-CM

## 2024-11-04 PROCEDURE — 99214 OFFICE O/P EST MOD 30 MIN: CPT

## 2024-11-04 PROCEDURE — G2211 COMPLEX E/M VISIT ADD ON: CPT

## 2024-12-02 ENCOUNTER — APPOINTMENT (OUTPATIENT)
Dept: ULTRASOUND IMAGING | Facility: CLINIC | Age: 84
End: 2024-12-02
Payer: MEDICARE

## 2024-12-02 ENCOUNTER — OUTPATIENT (OUTPATIENT)
Dept: OUTPATIENT SERVICES | Facility: HOSPITAL | Age: 84
LOS: 1 days | End: 2024-12-02
Payer: COMMERCIAL

## 2024-12-02 ENCOUNTER — RESULT REVIEW (OUTPATIENT)
Age: 84
End: 2024-12-02

## 2024-12-02 DIAGNOSIS — Z90.89 ACQUIRED ABSENCE OF OTHER ORGANS: Chronic | ICD-10-CM

## 2024-12-02 DIAGNOSIS — Z90.49 ACQUIRED ABSENCE OF OTHER SPECIFIED PARTS OF DIGESTIVE TRACT: Chronic | ICD-10-CM

## 2024-12-02 DIAGNOSIS — Z98.890 OTHER SPECIFIED POSTPROCEDURAL STATES: Chronic | ICD-10-CM

## 2024-12-02 DIAGNOSIS — E04.1 NONTOXIC SINGLE THYROID NODULE: ICD-10-CM

## 2024-12-02 PROCEDURE — 10005 FNA BX W/US GDN 1ST LES: CPT

## 2024-12-02 PROCEDURE — 88173 CYTOPATH EVAL FNA REPORT: CPT

## 2024-12-02 PROCEDURE — 88173 CYTOPATH EVAL FNA REPORT: CPT | Mod: 26

## 2024-12-03 ENCOUNTER — APPOINTMENT (OUTPATIENT)
Dept: INTERNAL MEDICINE | Facility: CLINIC | Age: 84
End: 2024-12-03

## 2024-12-18 ENCOUNTER — RX RENEWAL (OUTPATIENT)
Age: 84
End: 2024-12-18

## 2025-01-13 LAB
T3 SERPL-MCNC: 100 NG/DL
T4 FREE SERPL-MCNC: 1.3 NG/DL
TSH RECEPTOR AB: <1.1 IU/L
TSH SERPL-ACNC: 0.94 UIU/ML

## 2025-01-14 ENCOUNTER — APPOINTMENT (OUTPATIENT)
Dept: ENDOCRINOLOGY | Facility: CLINIC | Age: 85
End: 2025-01-14
Payer: MEDICARE

## 2025-01-14 DIAGNOSIS — E04.1 NONTOXIC SINGLE THYROID NODULE: ICD-10-CM

## 2025-01-14 DIAGNOSIS — E05.90 THYROTOXICOSIS, UNSPECIFIED W/OUT THYROTOXIC CRISIS OR STORM: ICD-10-CM

## 2025-01-14 PROCEDURE — G2211 COMPLEX E/M VISIT ADD ON: CPT

## 2025-01-14 PROCEDURE — 99214 OFFICE O/P EST MOD 30 MIN: CPT

## 2025-01-24 LAB — TSI ACT/NOR SER: 0.48 IU/L

## 2025-01-29 ENCOUNTER — APPOINTMENT (OUTPATIENT)
Dept: INTERNAL MEDICINE | Facility: CLINIC | Age: 85
End: 2025-01-29
Payer: MEDICARE

## 2025-01-29 VITALS
HEIGHT: 58 IN | TEMPERATURE: 98.2 F | OXYGEN SATURATION: 95 % | DIASTOLIC BLOOD PRESSURE: 70 MMHG | SYSTOLIC BLOOD PRESSURE: 118 MMHG | BODY MASS INDEX: 20.99 KG/M2 | HEART RATE: 71 BPM | WEIGHT: 100 LBS | RESPIRATION RATE: 14 BRPM

## 2025-01-29 DIAGNOSIS — Z00.00 ENCOUNTER FOR GENERAL ADULT MEDICAL EXAMINATION W/OUT ABNORMAL FINDINGS: ICD-10-CM

## 2025-01-29 PROCEDURE — G0439: CPT

## 2025-02-01 DIAGNOSIS — I10 ESSENTIAL (PRIMARY) HYPERTENSION: ICD-10-CM

## 2025-02-01 DIAGNOSIS — R74.8 ABNORMAL LEVELS OF OTHER SERUM ENZYMES: ICD-10-CM

## 2025-02-01 DIAGNOSIS — R73.03 PREDIABETES.: ICD-10-CM

## 2025-02-01 LAB
25(OH)D3 SERPL-MCNC: 24.5 NG/ML
ALBUMIN SERPL ELPH-MCNC: 4.3 G/DL
ALP BLD-CCNC: 123 U/L
ALT SERPL-CCNC: 15 U/L
ANION GAP SERPL CALC-SCNC: 11 MMOL/L
AST SERPL-CCNC: 16 U/L
BASOPHILS # BLD AUTO: 0.04 K/UL
BASOPHILS NFR BLD AUTO: 0.7 %
BILIRUB SERPL-MCNC: 0.4 MG/DL
BUN SERPL-MCNC: 25 MG/DL
CALCIUM SERPL-MCNC: 9.8 MG/DL
CHLORIDE SERPL-SCNC: 107 MMOL/L
CHOLEST SERPL-MCNC: 143 MG/DL
CO2 SERPL-SCNC: 30 MMOL/L
CREAT SERPL-MCNC: 0.99 MG/DL
EGFR: 56 ML/MIN/1.73M2
EOSINOPHIL # BLD AUTO: 0.04 K/UL
EOSINOPHIL NFR BLD AUTO: 0.7 %
ESTIMATED AVERAGE GLUCOSE: 126 MG/DL
FOLATE SERPL-MCNC: 8.5 NG/ML
GLUCOSE SERPL-MCNC: 94 MG/DL
HBA1C MFR BLD HPLC: 6 %
HCT VFR BLD CALC: 43.1 %
HDLC SERPL-MCNC: 84 MG/DL
HGB BLD-MCNC: 13.4 G/DL
IMM GRANULOCYTES NFR BLD AUTO: 0.2 %
LDLC SERPL CALC-MCNC: 48 MG/DL
LYMPHOCYTES # BLD AUTO: 1.17 K/UL
LYMPHOCYTES NFR BLD AUTO: 20.7 %
MAN DIFF?: NORMAL
MCHC RBC-ENTMCNC: 29.8 PG
MCHC RBC-ENTMCNC: 31.1 G/DL
MCV RBC AUTO: 95.8 FL
MONOCYTES # BLD AUTO: 0.5 K/UL
MONOCYTES NFR BLD AUTO: 8.8 %
NEUTROPHILS # BLD AUTO: 3.89 K/UL
NEUTROPHILS NFR BLD AUTO: 68.9 %
NONHDLC SERPL-MCNC: 58 MG/DL
PLATELET # BLD AUTO: 185 K/UL
POTASSIUM SERPL-SCNC: 4.5 MMOL/L
PROT SERPL-MCNC: 7 G/DL
RBC # BLD: 4.5 M/UL
RBC # FLD: 14.3 %
SODIUM SERPL-SCNC: 148 MMOL/L
TRIGL SERPL-MCNC: 44 MG/DL
VIT B12 SERPL-MCNC: 447 PG/ML
WBC # FLD AUTO: 5.65 K/UL

## 2025-04-17 ENCOUNTER — RX RENEWAL (OUTPATIENT)
Age: 85
End: 2025-04-17

## 2025-04-28 ENCOUNTER — APPOINTMENT (OUTPATIENT)
Dept: NEUROLOGY | Facility: CLINIC | Age: 85
End: 2025-04-28
Payer: MEDICARE

## 2025-04-28 VITALS
HEIGHT: 58 IN | DIASTOLIC BLOOD PRESSURE: 91 MMHG | BODY MASS INDEX: 20.99 KG/M2 | HEART RATE: 77 BPM | WEIGHT: 100 LBS | SYSTOLIC BLOOD PRESSURE: 153 MMHG | OXYGEN SATURATION: 95 %

## 2025-04-28 DIAGNOSIS — G20.A1 PARKINSON'S DISEASE WITHOUT DYSKINESIA, WITHOUT MENTION OF FLUCTUATIONS: ICD-10-CM

## 2025-04-28 PROCEDURE — 99214 OFFICE O/P EST MOD 30 MIN: CPT

## 2025-05-02 ENCOUNTER — NON-APPOINTMENT (OUTPATIENT)
Age: 85
End: 2025-05-02

## 2025-06-16 ENCOUNTER — OFFICE (OUTPATIENT)
Dept: URBAN - METROPOLITAN AREA CLINIC 109 | Facility: CLINIC | Age: 85
Setting detail: OPHTHALMOLOGY
End: 2025-06-16
Payer: MEDICARE

## 2025-06-16 DIAGNOSIS — H25.13: ICD-10-CM

## 2025-06-16 DIAGNOSIS — H25.12: ICD-10-CM

## 2025-06-16 DIAGNOSIS — H52.223: ICD-10-CM

## 2025-06-16 PROBLEM — H00.12 CHALAZION; RIGHT LOWER LID: Status: ACTIVE | Noted: 2025-06-16

## 2025-06-16 PROBLEM — H25.11 CATARACT SENILE NUCLEAR SCLEROSIS; RIGHT EYE, LEFT EYE, BOTH EYES: Status: ACTIVE | Noted: 2025-06-16

## 2025-06-16 PROCEDURE — 92136 OPHTHALMIC BIOMETRY: CPT | Performed by: OPHTHALMOLOGY

## 2025-06-16 PROCEDURE — 99213 OFFICE O/P EST LOW 20 MIN: CPT | Performed by: OPHTHALMOLOGY

## 2025-06-16 PROCEDURE — 92025 CPTRIZED CORNEAL TOPOGRAPHY: CPT | Performed by: OPHTHALMOLOGY

## 2025-06-16 ASSESSMENT — REFRACTION_MANIFEST
OD_SPHERE: +0.50
OS_SPHERE: +0.50
OD_AXIS: 090
OS_AXIS: 095
OD_VA1: 20/NI
OS_CYLINDER: -2.00
OS_VA1: 20/NI
OD_CYLINDER: -1.50

## 2025-06-16 ASSESSMENT — REFRACTION_AUTOREFRACTION
OS_SPHERE: +0.75
OS_AXIS: 091
OD_SPHERE: +0.75
OS_CYLINDER: -3.00
OD_AXIS: 070
OD_CYLINDER: -1.75

## 2025-06-16 ASSESSMENT — PACHYMETRY
OS_CT_UM: 528
OD_CT_CORRECTION: 1
OD_CT_UM: 520
OS_CT_CORRECTION: 1

## 2025-06-16 ASSESSMENT — KERATOMETRY
OD_CYLAXISANGLE_DEGREES: 155
OS_CYLPOWER_DEGREES: 1.25
OS_AXISANGLE_DEGREES: 008
OS_K2POWER_DIOPTERS: 45.00
OD_K1POWER_DIOPTERS: 44.24
OD_K1POWER_DIOPTERS: 44.24
OD_CYLPOWER_DEGREES: 0.51
OD_K2POWER_DIOPTERS: 44.75
OS_K2POWER_DIOPTERS: 45.00
OD_K1K2_AVERAGE: 44.495
OS_AXISANGLE2_DEGREES: 008
OS_K1POWER_DIOPTERS: 43.75
OS_AXISANGLE_DEGREES: 98
OS_K1K2_AVERAGE: 44.375
OD_AXISANGLE2_DEGREES: 155
OD_K2POWER_DIOPTERS: 44.75
OD_AXISANGLE_DEGREES: 155
OS_K1POWER_DIOPTERS: 43.75
OS_CYLAXISANGLE_DEGREES: 008
METHOD_AUTO_MANUAL: AUTO
OD_AXISANGLE_DEGREES: 65

## 2025-06-16 ASSESSMENT — CONFRONTATIONAL VISUAL FIELD TEST (CVF)
OD_FINDINGS: FULL
OS_FINDINGS: FULL

## 2025-06-16 ASSESSMENT — TONOMETRY
OD_IOP_MMHG: 14
OS_IOP_MMHG: 16

## 2025-06-16 ASSESSMENT — VISUAL ACUITY
OS_BCVA: 20/70+
OD_BCVA: 20/70

## 2025-07-16 ENCOUNTER — RX RENEWAL (OUTPATIENT)
Age: 85
End: 2025-07-16

## 2025-07-30 ENCOUNTER — RX RENEWAL (OUTPATIENT)
Age: 85
End: 2025-07-30

## 2025-08-18 ENCOUNTER — RX RENEWAL (OUTPATIENT)
Age: 85
End: 2025-08-18